# Patient Record
Sex: FEMALE
[De-identification: names, ages, dates, MRNs, and addresses within clinical notes are randomized per-mention and may not be internally consistent; named-entity substitution may affect disease eponyms.]

---

## 2018-07-25 ENCOUNTER — HOSPITAL ENCOUNTER (EMERGENCY)
Dept: HOSPITAL 14 - H.ER | Age: 73
LOS: 1 days | Discharge: HOME | End: 2018-07-26
Payer: MEDICARE

## 2018-07-25 DIAGNOSIS — Z79.82: ICD-10-CM

## 2018-07-25 DIAGNOSIS — Z99.2: ICD-10-CM

## 2018-07-25 DIAGNOSIS — I12.0: ICD-10-CM

## 2018-07-25 DIAGNOSIS — K59.00: Primary | ICD-10-CM

## 2018-07-25 DIAGNOSIS — N18.6: ICD-10-CM

## 2018-07-25 DIAGNOSIS — Z79.4: ICD-10-CM

## 2018-07-25 PROCEDURE — 80053 COMPREHEN METABOLIC PANEL: CPT

## 2018-07-25 PROCEDURE — 83690 ASSAY OF LIPASE: CPT

## 2018-07-25 PROCEDURE — 74022 RADEX COMPL AQT ABD SERIES: CPT

## 2018-07-25 PROCEDURE — 85025 COMPLETE CBC W/AUTO DIFF WBC: CPT

## 2018-07-25 PROCEDURE — 83605 ASSAY OF LACTIC ACID: CPT

## 2018-07-25 PROCEDURE — 99284 EMERGENCY DEPT VISIT MOD MDM: CPT

## 2018-07-26 VITALS
TEMPERATURE: 98.4 F | OXYGEN SATURATION: 100 % | RESPIRATION RATE: 18 BRPM | DIASTOLIC BLOOD PRESSURE: 69 MMHG | SYSTOLIC BLOOD PRESSURE: 130 MMHG | HEART RATE: 88 BPM

## 2018-07-26 LAB
ALBUMIN SERPL-MCNC: 4.1 G/DL (ref 3.5–5)
ALBUMIN/GLOB SERPL: 1.1 {RATIO} (ref 1–2.1)
ALT SERPL-CCNC: 29 U/L (ref 9–52)
AST SERPL-CCNC: 40 U/L (ref 14–36)
BASOPHILS # BLD AUTO: 0.1 K/UL (ref 0–0.2)
BASOPHILS NFR BLD: 1 % (ref 0–2)
BUN SERPL-MCNC: 41 MG/DL (ref 7–17)
CALCIUM SERPL-MCNC: 9.1 MG/DL (ref 8.4–10.2)
EOSINOPHIL # BLD AUTO: 0.2 K/UL (ref 0–0.7)
EOSINOPHIL NFR BLD: 2.1 % (ref 0–4)
ERYTHROCYTE [DISTWIDTH] IN BLOOD BY AUTOMATED COUNT: 17.6 % (ref 11.5–14.5)
GFR NON-AFRICAN AMERICAN: 6
HGB BLD-MCNC: 12.3 G/DL (ref 12–16)
LIPASE SERPL-CCNC: 101 U/L (ref 23–300)
LYMPHOCYTES # BLD AUTO: 1.7 K/UL (ref 1–4.3)
LYMPHOCYTES NFR BLD AUTO: 16.8 % (ref 20–40)
MCH RBC QN AUTO: 29 PG (ref 27–31)
MCHC RBC AUTO-ENTMCNC: 32.5 G/DL (ref 33–37)
MCV RBC AUTO: 89.1 FL (ref 81–99)
MONOCYTES # BLD: 1.2 K/UL (ref 0–0.8)
MONOCYTES NFR BLD: 12.2 % (ref 0–10)
NEUTROPHILS # BLD: 6.9 K/UL (ref 1.8–7)
NEUTROPHILS NFR BLD AUTO: 67.9 % (ref 50–75)
NRBC BLD AUTO-RTO: 0 % (ref 0–0)
PLATELET # BLD: 363 K/UL (ref 130–400)
PMV BLD AUTO: 9.9 FL (ref 7.2–11.7)
RBC # BLD AUTO: 4.25 MIL/UL (ref 3.8–5.2)
WBC # BLD AUTO: 10.2 K/UL (ref 4.8–10.8)

## 2018-07-26 NOTE — ED PDOC
HPI: Abdomen


Time Seen by Provider: 07/25/18 23:26


Chief Complaint (Nursing): Abdominal Pain


Chief Complaint (Provider): Abdominal Pain


History Per: Patient


History/Exam Limitations: no limitations


Onset/Duration Of Symptoms: Days (x3 weeks)


Current Symptoms Are (Timing): Still Present


Location Of Pain/Discomfort: Diffuse


Quality Of Discomfort: "Pain"


Associated Symptoms: Constipation


Additional Complaint(s): 





72 year old female with a history of dm, htn, seizure and end-stage renal 

disease reports to the ED complaining of diffuse abdominal pain and 

constipation onset 3 weeks. Patient reports she has not had a bowel movement in 

3 weeks, but has not taken anything for the constipation. She states she has 

associated rectal pain and decreased appetite but denies nausea, vomiting, 

urinary symptoms, black or bloody stools. Patient is on dialysis. 





PMD Dr. Johnson 





Past Medical History


Reviewed: Historical Data, Nursing Documentation, Vital Signs


Vital Signs: 


 Last Vital Signs











Temp  98.4 F   07/26/18 02:04


 


Pulse  88   07/26/18 02:04


 


Resp  18   07/26/18 02:04


 


BP  130/69   07/26/18 02:04


 


Pulse Ox  100   07/26/18 02:04














- Medical History


PMH: Diabetes, Gastritis, HTN, End Stage Renal Disease (Hemodialysis M, W, F), 

Chronic Kidney Disease, Seizures


   Denies: Alzheimer's Disease, Anemia, Anxiety, Arthritis, Asthma, Atrial 

Fibrillation, Bipolar Disorder, Bronchitis, CAD, Cardia Arrhythmia, CHF, COPD, 

Crohn's Disease, Dementia, Depression, Diverticulitis, Emphysema, Fractures, 

Gall Bladder Disease, HIV, Hypercholesterolemia, Hyperthyroidism, Hypothyroidism

, Kidney Stones, Migraine, Mitral Valve Prolapse, Multiple Sclerosis, 

Osteoporosis, Pancreatitis, Paranoia, Parkinson's Disease, Peripheral Edema, 

Pneumonia, Post Traumatic Stress Disorder, Pulmonary Embolism, Rheumatoid 

Arthritis, Schizophrenia, Sickle Cell Disease, Sexually Transmitted Disease, 

Sleep Apnea, TIA





- Surgical History


Surgical History: 


   Denies: Pacemaker


Other surgeries: Left AV fistula





- Family History


Family History: States: No Known Family Hx





- Social History


Current smoker - smoking cessation education provided: No


Ex-Smoker (has not smoked in the last 12 months): No


Alcohol: None


Drugs: Denies





- Immunization History


Hx Tetanus Toxoid Vaccination: No


Hx Influenza Vaccination: No


Hx Pneumococcal Vaccination: No





- Home Medications


Home Medications: 


 Ambulatory Orders











 Medication  Instructions  Recorded


 


Insulin Aspart Mix [novolog Mix 10 unit SC BID 01/30/15





70/30]  


 


Phenytoin Sodium Extended 100 mg PO TID 01/30/15





[Dilantin]  


 


Amoxicillin [Amoxil 500 mg Cap] 500 mg PO BID 05/17/18


 


Butalbital/Aspirin/Caffeine 1 tab PO Q6 PRN 05/17/18





[Butalb-Aspirin-Caffe -40]  


 


Levofloxacin [Levaquin] 500 mg PO DAILY 05/17/18


 


Omeprazole 20 mg PO BID 05/17/18


 


Polyethylene Glycol 3350 [Miralax] 17 g PO QAM PRN #7 pkg 07/26/18














- Allergies


Allergies/Adverse Reactions: 


 Allergies











Allergy/AdvReac Type Severity Reaction Status Date / Time


 


No Known Allergies Allergy   Verified 07/25/18 22:54














Review of Systems


ROS Statement: Except As Marked, All Systems Reviewed And Found Negative


Gastrointestinal: Positive for: Abdominal Pain, Constipation





Physical Exam





- Reviewed


Nursing Documentation Reviewed: Yes


Vital Signs Reviewed: Yes





- Physical Exam


Appears: Positive for: Non-toxic, In Acute Distress (painful)


Head Exam: Positive for: ATRAUMATIC, NORMOCEPHALIC


Skin: Positive for: Warm, Dry


ENT: Negative for: Pharyngeal Erythema, Tonsillar Exudate


Neck: Positive for: Painless ROM, Supple


Cardiovascular/Chest: Positive for: Regular Rate, Rhythm


Respiratory: Positive for: Normal Breath Sounds.  Negative for: Respiratory 

Distress


Gastrointestinal/Abdominal: Positive for: Tenderness (mild diffuse tenderness 

to palpation ), Distended (softly).  Negative for: Mass, Guarding, Rebound


Rectal: Positive for: Normal Exam, Other (no fecal impaction, no gross blood, 

chaperone was [facility] nurse)


Extremity: Positive for: Other (LEFT upper arm AV fistula with thrill).  

Negative for: Deformity


Lymphatic: Negative for: Adenopathy


Neurologic/Psych: Positive for: Alert.  Negative for: Motor/Sensory Deficits





- Laboratory Results


Result Diagrams: 


 07/25/18 23:55





 07/25/18 23:55





Medical Decision Making


Medical Decision Making: 





Time: 23:38


Initial Impression: abdominal 


Differential diagnoses include but are not limited to: constipation, obstruction

, colitis, or diverticulitis


Initial Plan:


--CMP   


--Lact acid


-- Lipase


--Urine dip


--CBC with differentials


--Obstructive Series XR


--Occult Blood, Stool








--------------------------------------------------------------------------------

-----------------


Scribe Attestation:


Documented by Carrie Díaz, acting as a scribe for Vanessa Ibarra MD





Provider Scribe Attestation:


All medical record entries made by the Scribe were at my direction and 

personally dictated by me. I have reviewed the chart and agree that the record 

accurately reflects my personal performance of the history, physical exam, 

medical decision making, and the department course for this patient. I have 

also personally directed, reviewed, and agree with the discharge instructions 

and disposition.





Disposition





- Clinical Impression


Clinical Impression: 


 Constipation








- Disposition


Disposition: Transfer of Care


Disposition Time: 00:00


Condition: STABLE


Prescriptions: 


Polyethylene Glycol 3350 [Miralax] 17 g PO QAM PRN #7 pkg


 PRN Reason: Constipation


Instructions:  Constipation in Adults


Forms:  [employer] (English)


Print Language: Cape Verdean


Patient Signed Over To: Grayson George


Handoff Comments: Pending ER workup, reassessment and final disposition

## 2018-07-26 NOTE — RAD
Date of service: 



07/26/2018



PROCEDURE:  Radiographs of the chest and abdomen (obstructive series)



HISTORY:

abd pain r/o sbo  



COMPARISON:

Chest radiograph 05/17/2018.



TECHNIQUE:

AP radiograph of the chest, with upright and supine radiographs of 

the abdomen.



FINDINGS:



CHEST:

Lungs: No infiltrate bilaterally.  Limited linear fibrotic changes 

are reiterated at the inferior left lung zone.



Cardiovascular: Stable mild cardiomegaly. No definite pulmonary 

vascular congestion.



Pleura: No pleural fluid. No pneumothorax.



Other findings: Left axilla/proximal upper extremity wallstents.



ABDOMEN AND PELVIS:

Bowel: Nonobstructive bowel gas pattern is appreciated.  Prominent 

fecal loading is seen throughout the colon and may indicate 

constipation.



Free air: None.



Bones:  Unremarkable.



Other findings: No definite abnormal internal calcifications.



IMPRESSION:

1. Nonobstructive bowel gas pattern, however, prominent fecal loading 

throughout the colon may indicate constipation. No free 

intraperitoneal gas. 



2. Stable mild cardiomegaly. No pulmonary vascular congestion. 

Limited fibrosis left base laterally.

## 2018-07-26 NOTE — ED PDOC
- Laboratory Results


Result Diagrams: 


 07/25/18 23:55





 07/25/18 23:55





Medical Decision Making


Medical Decision Making: 








Time: 00:00


--Patient signed out to this provider by Dr. Ibarra, pending XR, labs and 

reevaluation,








Time: 00:45


--Chest series shows no active disease


--Abdomen series shows gross amounts of stool in colon and rectum, but no free 

air or fluid bubbles.








Time: 1:50


--Labs showed no clinically significant results except elevated creatinine, 

which is consistent with end stage renal disease. Patient feels better after 

complete enema and had a large bowel movement. She is ready for discharge home. 

Diagnosis constipation.











--------------------------------------------------------------------------------

-----------------


Scribe Attestation:


Documented by Carrie Díaz, acting as a scribe for Grayson George MD





Provider Scribe Attestation:


All medical record entries made by the Scribe were at my direction and 

personally dictated by me. I have reviewed the chart and agree that the record 

accurately reflects my personal performance of the history, physical exam, 

medical decision making, and the department course for this patient. I have 

also personally directed, reviewed, and agree with the discharge instructions 

and disposition.





Disposition





- Clinical Impression


Clinical Impression: 


 Constipation








- POA


Present On Arrival: None





- Disposition


Disposition: Routine/Home


Disposition Time: 01:50


Condition: STABLE


Prescriptions: 


Polyethylene Glycol 3350 [Miralax] 17 g PO QAM PRN #7 pkg


 PRN Reason: Constipation


Instructions:  Constipation in Adults


Forms:  [facility] (English)


Print Language: Ukrainian

## 2018-07-27 ENCOUNTER — HOSPITAL ENCOUNTER (EMERGENCY)
Dept: HOSPITAL 14 - H.ER | Age: 73
LOS: 1 days | Discharge: HOME | End: 2018-07-28
Payer: MEDICARE

## 2018-07-27 DIAGNOSIS — K59.00: Primary | ICD-10-CM

## 2018-07-27 DIAGNOSIS — I12.0: ICD-10-CM

## 2018-07-27 DIAGNOSIS — Z79.4: ICD-10-CM

## 2018-07-28 VITALS
TEMPERATURE: 98.8 F | RESPIRATION RATE: 18 BRPM | HEART RATE: 79 BPM | SYSTOLIC BLOOD PRESSURE: 149 MMHG | OXYGEN SATURATION: 94 % | DIASTOLIC BLOOD PRESSURE: 67 MMHG

## 2018-07-28 NOTE — RAD
Date of service: 



07/27/2018



PROCEDURE:  Radiographs of the chest and abdomen (obstructive series)



HISTORY:

constipation  



COMPARISON:

Obstructive series dated 07/26/2018.



TECHNIQUE:

AP radiograph of the chest, with upright and supine radiographs of 

the abdomen.



FINDINGS:



CHEST:

Lungs: Clear.



Cardiovascular: Atherosclerotic aortic calcifications.  

Cardiomediastinal silhouette stably enlarged.



Pleura: No pleural fluid. No pneumothorax.



Other findings: Partially imaged left axillary region vascular stent.



ABDOMEN AND PELVIS:

Bowel: Prominent amount of retained colonic stool. No evidence of 

mechanical obstruction.



Free air: None.



Bones:  Degenerative changes.



Other findings: None.



IMPRESSION:

Prominent amount of retained colonic stool.

## 2018-07-28 NOTE — ED PDOC
HPI: Abdomen


Time Seen by Provider: 07/27/18 22:53


Chief Complaint (Nursing): GI Problem


Chief Complaint (Provider): GI Problem


History Per: Patient


History/Exam Limitations: no limitations


Onset/Duration Of Symptoms: Days (x3)


Current Symptoms Are (Timing): Still Present


Additional Complaint(s): 


72 year old female arrives to ED with complaint of constipation associated with 

mild lower abdominal discomfort and rectal pain ongoing for 3 days. Reports she 

normally has a BM QD. Patient states she was given Rx for constipation, which 

she took today, but with no improvement. Of note, patient is due for 

hemodialysis tomorrow. Otherwise: (-) fever, (-) chills, (-) nausea, (-) 

vomiting or (-) urinary symptoms.





PMD: Dr. Cristina Johnson





Past Medical History


Reviewed: Historical Data, Nursing Documentation, Vital Signs


Vital Signs: 


 Last Vital Signs











Temp  98.9 F   07/27/18 22:45


 


Pulse  81   07/27/18 22:45


 


Resp  16   07/27/18 22:45


 


BP  187/83 H  07/27/18 22:45


 


Pulse Ox  99   07/28/18 01:37














- Medical History


PMH: Diabetes, Gastritis, HTN, End Stage Renal Disease (Hemodialysis M, W, F), 

Chronic Kidney Disease, Seizures


   Denies: Alzheimer's Disease, Anemia, Anxiety, Arthritis, Asthma, Atrial 

Fibrillation, Bipolar Disorder, Bronchitis, CAD, Cardia Arrhythmia, CHF, COPD, 

Crohn's Disease, Dementia, Depression, Diverticulitis, Emphysema, Fractures, 

Gall Bladder Disease, HIV, Hypercholesterolemia, Hyperthyroidism, Hypothyroidism

, Kidney Stones, Migraine, Mitral Valve Prolapse, Multiple Sclerosis, 

Osteoporosis, Pancreatitis, Paranoia, Parkinson's Disease, Peripheral Edema, 

Pneumonia, Post Traumatic Stress Disorder, Pulmonary Embolism, Rheumatoid 

Arthritis, Schizophrenia, Sickle Cell Disease, Sexually Transmitted Disease, 

Sleep Apnea, TIA





- Surgical History


Surgical History: 


   Denies: Pacemaker





- Family History


Family History: States: Unknown Family Hx





- Social History


Current smoker - smoking cessation education provided: No


Ex-Smoker (has not smoked in the last 12 months): No


Alcohol: None


Drugs: Denies





- Immunization History


Hx Tetanus Toxoid Vaccination: No


Hx Influenza Vaccination: No


Hx Pneumococcal Vaccination: No





- Home Medications


Home Medications: 


 Ambulatory Orders











 Medication  Instructions  Recorded


 


Insulin Aspart Mix [novolog Mix 10 unit SC BID 01/30/15





70/30]  


 


Phenytoin Sodium Extended 100 mg PO TID 01/30/15





[Dilantin]  


 


Amoxicillin [Amoxil 500 mg Cap] 500 mg PO BID 05/17/18


 


Butalbital/Aspirin/Caffeine 1 tab PO Q6 PRN 05/17/18





[Butalb-Aspirin-Caffe -40]  


 


Levofloxacin [Levaquin] 500 mg PO DAILY 05/17/18


 


Omeprazole 20 mg PO BID 05/17/18


 


Polyethylene Glycol 3350 [Miralax] 17 g PO QAM PRN #7 pkg 07/26/18


 


Polyethylene Glycol 3350 [Miralax] 17 pow PO DAILY #20 each 07/28/18














- Allergies


Allergies/Adverse Reactions: 


 Allergies











Allergy/AdvReac Type Severity Reaction Status Date / Time


 


No Known Allergies Allergy   Verified 07/25/18 22:54














Review of Systems


ROS Statement: Except As Marked, All Systems Reviewed And Found Negative


Constitutional: Negative for: Fever, Chills


Gastrointestinal: Positive for: Abdominal Pain (lower), Constipation, Rectal 

Pain.  Negative for: Nausea, Vomiting


Genitourinary Female: Negative for: Dysuria, Incontinence, Hematuria





Physical Exam





- Reviewed


Nursing Documentation Reviewed: Yes


Vital Signs Reviewed: Yes





- Physical Exam


Comments: 


GENERAL APPEARANCE: Patient is awake, alert, oriented x 3, in no distress. 


SKIN:  Warm, dry; (-) cyanosis.


EYES:  (-) conjunctival pallor, (-) scleral icterus.


ENMT:  Mucous membranes moist.


NECK:  (-) tenderness, (-) stiffness, (-) lymphadenopathy.


CHEST AND RESPIRATORY:  (-) rales, (-) rhonchi, (-) wheezes; breath sounds 

equal bilaterally.


HEART AND CARDIOVASCULAR:  (-) irregularity; (-) murmur, (-) gallop.


ABDOMEN AND GI:  (+) Mild distention, (+) soft.  Bowel sounds active; (-) 

tenderness. (-) guarding, (-) rebound, (-) palpable masses, (-) CVA tenderness.


RECTAL: Performed with ER RN present as chaperone. (+) Hard stools, (-) fecal 

impaction, (-) guaiac test.


EXTREMITIES:  (-) deformity, (-) edema, (+) distal pulses.


NEURO AND PSYCH:  Mental status as above; (-) focal findings.





- ECG


O2 Sat by Pulse Oximetry: 99 (RA)


Pulse Ox Interpretation: Normal





Medical Decision Making


Medical Decision Making: 


Initial Impression: Abdominal pain; constipation





Initial Plan:


* Xray obstructive series


* Enulose 20gm PO


* Fleet enema 135ml LA


----------





Time: 2340


--Xray obstructive series: interpreted by PA. Moderate constipation noted. Non-

specific bowel patterns.








On re-evaluation, patient reports improvement of symptoms, denies any abdominal 

pain, patient had a few BMs in the ER. On exam, patient remains AAOx3, in no 

acute distress. 


Diagnostic results d/w the patient in great detail. Diagnosis of constipation d/

w the patient. Advised to increase intake of fiber rich foods and do some light 

daily exercise such as 30 mins of brisk walking


Based on history, exam and diagnostic results, plan will be for outpatient 

follow up. 


Patient instructed to follow-up with pmd in 1-2 days without fail. Advised to 

take medication as prescribed. Return to the emergency room at any time for any 

new or worsening symptoms. Patient states she fully agrees with and understands 

discharge instructions. States that she agrees with the plan and disposition. 

Verbalized and repeated discharge instructions and plan. I have given the 

patient opportunity to ask any additional questions.





--------------------------------------------------------------------------------

-----------------


Scribe Attestation:


Documented by Joyce Conrad, acting as a scribe for Polina Lloyd PA-C.





Provider Scribe Attestation:


All medical record entries made by the Scribe were at my direction and 

personally dictated by me. I have reviewed the chart and agree that the record 

accurately reflects my personal performance of the history, physical exam, 

medical decision making, and the department course for this patient. I have 

also personally directed, reviewed, and agree with the discharge instructions 

and disposition.





Disposition





- Clinical Impression


Clinical Impression: 


 Constipation








- Patient ED Disposition


Is Patient to be Admitted: No


Counseled Patient/Family Regarding: Studies Performed, Diagnosis, Need For 

Followup, Rx Given





- Disposition


Disposition: Routine/Home


Disposition Time: 02:15


Condition: STABLE


Additional Instructions: 


Farshad por dejarnos atenderlo hoy. Usted fue tratado por estreimiento. La 

atencin mdica de emergencia que recibi hoy se dirigi a los sntomas agudos 

de presentacin. Aumente la ingesta de alimentos ricos en fibra y mario algunos 

ejercicios diarios ligeros, florian caminar brenden 30 minutos. Si le 

prescribieron algn medicamento, por favor llnelo y d florian indicado. Brittaney s

ntomas pueden tardar varios dan en resolverse. Regrese al Departamento de 

Emergencia en cualquier momento si los sntomas empeoran, no mejoran o si surge 

algn otro problema.





Por favor, pngase en contacto con ndiaye mdico en 2 dan para jordan nueva evaluaci

n y seguimiento. Lleve todos los documentos que recibi al momento del maryuri 

junto con los medicamentos a ndiaye visita de seguimiento. Nuestro tratamiento no 

puede reemplazar la atencin mdica en curso por parte de un proveedor de atenci

n primaria (PCP) fuera del departamento de emergencias.





Farshad por permitir que el equipo de [facility] sea parte de ndiaye cuidado 

hoy.


Prescriptions: 


Polyethylene Glycol 3350 [Miralax] 17 pow PO DAILY #20 each


Instructions:  Constipation in Adults


Forms:  [employer] (English)


Print Language: Moldovan

## 2018-08-05 ENCOUNTER — HOSPITAL ENCOUNTER (EMERGENCY)
Dept: HOSPITAL 31 - C.ER | Age: 73
Discharge: HOME | End: 2018-08-05
Payer: MEDICARE

## 2018-08-05 VITALS
SYSTOLIC BLOOD PRESSURE: 180 MMHG | RESPIRATION RATE: 18 BRPM | HEART RATE: 72 BPM | TEMPERATURE: 98.2 F | DIASTOLIC BLOOD PRESSURE: 75 MMHG | OXYGEN SATURATION: 97 %

## 2018-08-05 DIAGNOSIS — R56.9: Primary | ICD-10-CM

## 2018-08-05 DIAGNOSIS — Z76.0: ICD-10-CM

## 2018-08-05 NOTE — C.PDOC
History Of Present Illness


71 y/o female presents to the ER requesting a refill for Dilatin. She explained 

she is unable to visit Dr. Odom until Tuesday. The patient offers no other 

medical complaints at this time. 


Time Seen by Provider: 08/05/18 21:17


Chief Complaint (Nursing): Med Refill


History Per: Patient


History/Exam Limitations: no limitations





Past Medical History


Vital Signs: 


 Last Vital Signs











Temp  98.2 F   08/05/18 21:11


 


Pulse  72   08/05/18 21:11


 


Resp  18   08/05/18 21:11


 


BP  180/75 H  08/05/18 21:11


 


Pulse Ox  97   08/05/18 21:28














- Medical History


PMH: Diabetes, Gastritis, HTN, End Stage Renal Disease (Hemodialysis M, W, F), 

Chronic Kidney Disease, Seizures


   Denies: Alzheimer's Disease, Anemia, Anxiety, Arthritis, Asthma, Atrial 

Fibrillation, Bipolar Disorder, Bronchitis, CAD, Cardia Arrhythmia, CHF, COPD, 

Crohn's Disease, Dementia, Depression, Diverticulitis, Emphysema, Fractures, 

Gall Bladder Disease, HIV, Hypercholesterolemia, Hyperthyroidism, Hypothyroidism

, Kidney Stones, Migraine, Mitral Valve Prolapse, Multiple Sclerosis, 

Osteoporosis, Pancreatitis, Paranoia, Parkinson's Disease, Peripheral Edema, 

Pneumonia, Post Traumatic Stress Disorder, Pulmonary Embolism, Rheumatoid 

Arthritis, Schizophrenia, Sickle Cell Disease, Sexually Transmitted Disease, 

Sleep Apnea, TIA


Surgical History: 


   Denies: Pacemaker





- [facility] Procedures








CENTRAL VENOUS CATHETER PLACEMENT WITH GUIDANCE (02/09/14)


CONTRAST AORTOGRAM (12/06/13)


CORONAR ARTERIOGR-2 CATH (12/11/13)


HEMODIALYSIS (01/30/15)


INJECT ANTICOAGULANT (12/13/13)


LEFT HEART CARDIAC CATH (12/11/13)


LT HEART ANGIOCARDIOGRAM (12/11/13)


PERFORMANCE OF URINARY FILTRATION, MULTIPLE (01/19/16)








Family History: States: Unknown Family Hx





- Social History


Hx Tobacco Use: No


Hx Alcohol Use: No


Hx Substance Use: No





- Immunization History


Hx Tetanus Toxoid Vaccination: No


Hx Influenza Vaccination: No


Hx Pneumococcal Vaccination: No





Review Of Systems


Except As Marked, All Systems Reviewed And Found Negative.


Constitutional: Negative for: Fever





Physical Exam





- Physical Exam


Appears: Well, Non-toxic, No Acute Distress


Skin: Normal Color, Warm


Head: Atraumatic, Normacephalic


Eye(s): bilateral: PERRL, EOMI


Ear(s): Bilateral: Normal


Oral Mucosa: Moist


Neck: Normal ROM


Chest: Symmetrical


Cardiovascular: Rhythm Regular, No Murmur


Respiratory: Normal Breath Sounds, No Rales, No Rhonchi, No Wheezing


Extremity: Normal ROM


Extremity: Bilateral: Atraumatic


Pulses: Left Radial: Normal, Right Radial: Normal


Neurological/Psych: Oriented x3


Gait: With Assistance (cane)





ED Course And Treatment


O2 Sat by Pulse Oximetry: 97 (RA )


Pulse Ox Interpretation: Normal





Medical Decision Making


Medical Decision Making: 


Prescription was refilled for patient. She is stable for discharge.








Disposition





- Disposition


Referrals: 


Hermelindo Odom MD [Primary Care Provider] - 


Disposition: HOME/ ROUTINE


Disposition Time: 21:23


Condition: GOOD


Additional Instructions: 


Follow up with the medical doctor within 1-2 days/clinic within 1-2 days. 

Return if worsened. 


Prescriptions: 


Phenytoin Sodium Extended 100 mg PO TID #12 cer


Instructions:  Seizures, Adult (DC)


Forms:  [employer] (English)


Print Language: French





- Clinical Impression


Clinical Impression: 


 Seizure, Medication refill








- PA / NP / Resident Statement


MD/DO has reviewed & agrees with the documentation as recorded.





- Scribe Statement


The provider has reviewed the documentation as recorded by the Scribe (Corry Gallegos)


All medical record entries made by the Scribe were at my direction and 

personally dictated by me. I have reviewed the chart and agree that the record 

accurately reflects my personal performance of the history, physical exam, 

medical decision making, and the department course for this patient. I have 

also personally directed, reviewed, and agree with the discharge instructions 

and disposition.

## 2018-09-18 ENCOUNTER — HOSPITAL ENCOUNTER (INPATIENT)
Dept: HOSPITAL 14 - H.ER | Age: 73
LOS: 6 days | Discharge: HOME | DRG: 871 | End: 2018-09-24
Attending: INTERNAL MEDICINE | Admitting: INTERNAL MEDICINE
Payer: MEDICARE

## 2018-09-18 VITALS — BODY MASS INDEX: 25 KG/M2

## 2018-09-18 DIAGNOSIS — K29.70: ICD-10-CM

## 2018-09-18 DIAGNOSIS — I12.0: ICD-10-CM

## 2018-09-18 DIAGNOSIS — N18.6: ICD-10-CM

## 2018-09-18 DIAGNOSIS — E11.22: ICD-10-CM

## 2018-09-18 DIAGNOSIS — A41.9: Primary | ICD-10-CM

## 2018-09-18 DIAGNOSIS — R06.03: ICD-10-CM

## 2018-09-18 DIAGNOSIS — E83.39: ICD-10-CM

## 2018-09-18 DIAGNOSIS — J18.9: ICD-10-CM

## 2018-09-18 DIAGNOSIS — E87.5: ICD-10-CM

## 2018-09-18 DIAGNOSIS — M54.5: ICD-10-CM

## 2018-09-18 DIAGNOSIS — J81.0: ICD-10-CM

## 2018-09-18 DIAGNOSIS — G40.909: ICD-10-CM

## 2018-09-18 DIAGNOSIS — Z85.43: ICD-10-CM

## 2018-09-18 DIAGNOSIS — J98.11: ICD-10-CM

## 2018-09-18 DIAGNOSIS — N25.81: ICD-10-CM

## 2018-09-18 DIAGNOSIS — J44.0: ICD-10-CM

## 2018-09-18 DIAGNOSIS — E87.79: ICD-10-CM

## 2018-09-18 DIAGNOSIS — Z99.2: ICD-10-CM

## 2018-09-18 DIAGNOSIS — N17.9: ICD-10-CM

## 2018-09-18 DIAGNOSIS — Z91.15: ICD-10-CM

## 2018-09-18 LAB
ALBUMIN SERPL-MCNC: 4.2 G/DL (ref 3.5–5)
ALBUMIN/GLOB SERPL: 1 {RATIO} (ref 1–2.1)
ALT SERPL-CCNC: 27 U/L (ref 9–52)
ANISOCYTOSIS BLD QL SMEAR: SLIGHT
ANISOCYTOSIS BLD QL SMEAR: SLIGHT
APTT BLD: 29.6 SECONDS (ref 25.6–37.1)
ARTERIAL BLOOD GAS O2 SAT: 98.6 % (ref 95–98)
ARTERIAL BLOOD GAS PCO2: 38 MM/HG (ref 35–45)
ARTERIAL BLOOD GAS TCO2: 28.2 MMOL/L (ref 22–28)
ARTERIAL PATENCY WRIST A: YES
AST SERPL-CCNC: 40 U/L (ref 14–36)
BASOPHILS # BLD AUTO: 0 K/UL (ref 0–0.2)
BASOPHILS # BLD AUTO: 0 K/UL (ref 0–0.2)
BASOPHILS NFR BLD: 0.1 % (ref 0–2)
BASOPHILS NFR BLD: 0.2 % (ref 0–2)
BUN SERPL-MCNC: 25 MG/DL (ref 7–17)
BUN SERPL-MCNC: 66 MG/DL (ref 7–17)
CALCIUM SERPL-MCNC: 9 MG/DL (ref 8.4–10.2)
CALCIUM SERPL-MCNC: 9.3 MG/DL (ref 8.4–10.2)
EOSINOPHIL # BLD AUTO: 0 K/UL (ref 0–0.7)
EOSINOPHIL # BLD AUTO: 0 K/UL (ref 0–0.7)
EOSINOPHIL NFR BLD: 0 % (ref 0–4)
EOSINOPHIL NFR BLD: 0.4 % (ref 0–4)
ERYTHROCYTE [DISTWIDTH] IN BLOOD BY AUTOMATED COUNT: 17.5 % (ref 11.5–14.5)
ERYTHROCYTE [DISTWIDTH] IN BLOOD BY AUTOMATED COUNT: 17.7 % (ref 11.5–14.5)
GFR NON-AFRICAN AMERICAN: 4
GFR NON-AFRICAN AMERICAN: 9
HCO3 BLDA-SCNC: 27.3 MMOL/L (ref 21–28)
HEPATITIS B CORE AB: NEGATIVE
HGB BLD-MCNC: 11.3 G/DL (ref 12–16)
HGB BLD-MCNC: 12.1 G/DL (ref 12–16)
HYPOCHROMIC: SLIGHT
INHALED O2 CONCENTRATION: 36 %
INR PPP: 1
LYMPHOCYTE: 11 % (ref 20–50)
LYMPHOCYTE: 7 % (ref 20–50)
LYMPHOCYTES # BLD AUTO: 0.7 K/UL (ref 1–4.3)
LYMPHOCYTES # BLD AUTO: 0.9 K/UL (ref 1–4.3)
LYMPHOCYTES NFR BLD AUTO: 4.2 % (ref 20–40)
LYMPHOCYTES NFR BLD AUTO: 5.8 % (ref 20–40)
MCH RBC QN AUTO: 26.9 PG (ref 27–31)
MCH RBC QN AUTO: 27.3 PG (ref 27–31)
MCHC RBC AUTO-ENTMCNC: 31.7 G/DL (ref 33–37)
MCHC RBC AUTO-ENTMCNC: 32.3 G/DL (ref 33–37)
MCV RBC AUTO: 84.6 FL (ref 81–99)
MCV RBC AUTO: 84.9 FL (ref 81–99)
MICROCYTES BLD QL SMEAR: SLIGHT
MONOCYTE: 2 % (ref 0–10)
MONOCYTE: 5 % (ref 0–10)
MONOCYTES # BLD: 0.3 K/UL (ref 0–0.8)
MONOCYTES # BLD: 1.2 K/UL (ref 0–0.8)
MONOCYTES NFR BLD: 2.3 % (ref 0–10)
MONOCYTES NFR BLD: 5.4 % (ref 0–10)
NEUTROPHILS # BLD: 10.4 K/UL (ref 1.8–7)
NEUTROPHILS # BLD: 19.7 K/UL (ref 1.8–7)
NEUTROPHILS NFR BLD AUTO: 78 % (ref 42–75)
NEUTROPHILS NFR BLD AUTO: 88 % (ref 42–75)
NEUTROPHILS NFR BLD AUTO: 90.2 % (ref 50–75)
NEUTROPHILS NFR BLD AUTO: 91.4 % (ref 50–75)
NEUTS BAND NFR BLD: 3 % (ref 0–2)
NEUTS BAND NFR BLD: 6 % (ref 0–2)
NRBC BLD AUTO-RTO: 0 % (ref 0–0)
NRBC BLD AUTO-RTO: 0 % (ref 0–0)
OVALOCYTES BLD QL SMEAR: SLIGHT
PH BLDA: 7.46 [PH] (ref 7.35–7.45)
PLATELET # BLD EST: NORMAL 10*3/UL
PLATELET # BLD EST: NORMAL 10*3/UL
PLATELET # BLD: 196 K/UL (ref 130–400)
PLATELET # BLD: 217 K/UL (ref 130–400)
PMV BLD AUTO: 9.1 FL (ref 7.2–11.7)
PMV BLD AUTO: 9.3 FL (ref 7.2–11.7)
PO2 BLDA: 108 MM/HG (ref 80–100)
POIKILOCYTOSIS BLD QL SMEAR: SLIGHT
PROTHROMBIN TIME: 10.6 SECONDS (ref 9.8–13.1)
RBC # BLD AUTO: 4.2 MIL/UL (ref 3.8–5.2)
RBC # BLD AUTO: 4.43 MIL/UL (ref 3.8–5.2)
TOTAL CELLS COUNTED BLD: 100
TOTAL CELLS COUNTED BLD: 100
TROPONIN I SERPL-MCNC: 0.06 NG/ML (ref 0–0.12)
WBC # BLD AUTO: 11.3 K/UL (ref 4.8–10.8)
WBC # BLD AUTO: 21.8 K/UL (ref 4.8–10.8)

## 2018-09-18 PROCEDURE — 5A1D70Z PERFORMANCE OF URINARY FILTRATION, INTERMITTENT, LESS THAN 6 HOURS PER DAY: ICD-10-PCS | Performed by: SPECIALIST

## 2018-09-18 RX ADMIN — PANTOPRAZOLE SODIUM SCH MG: 40 TABLET, DELAYED RELEASE ORAL at 22:48

## 2018-09-18 NOTE — CP.PCM.CON
History of Present Illness





- History of Present Illness


History of Present Illness: 





Infectious Disease Consultation Note-





Asked to see this patient at the request of  for rule out sepsis.








HPI-


Patient is a 72 year old female with PMH of DM II, ESRD On HD, HTN, who was 

brought to the emergency department by EMS for sob as she was being picked up 

for transport to dialysis and hence pt. was directly brought to ER for further 

evaluation .


On arrival here pt. was noticed to have high potassium and 905 oxygen 

saturation and fever of 101 and somewhat confused


Pt. admitted to ICU for further evaluation and treatment and i'm asked to rule 

out sepsis.


pt. is awake and alert but slightly confused with certain questions.


denies any HA, + fever, + Sob, no cough, denies any chest pain, pos abd. pain 

and nausea, denies any diarrhea.








Review of Systems





- Review of Systems


Review of Systems: 





ROS-


+ fever, denies any HA, + sob, no cough, denies any chest p[ain, + abd pain 

throughout, + nausea, denies any diarrhea





Past Patient History





- Infectious Disease


Hx of Infectious Diseases: None





- Past Medical History & Family History


Past Medical History?: Yes





- Past Social History


Smoking Status: Never Smoked


Home Situation {Lives}: With Family





- CARDIAC


Hx Cardiac Disorders: Yes


Hx Angina: No


Hx Atrial Fibrillation: No


Hx Cardia Arrhythmia: No


Hx Circulatory Problems: No


Hx Congestive Heart Failure: Yes


Hx Heart Attack: No


Hx Heart Murmur: No


Hx Heart Transplant: No


Hx Hypercholesterolemia: No


Hx Hypertension: Yes


Hx Hypotension: No


Hx Internal Defibrillator: No


Hx Mitral Valve Prolapse: No


Hx Pacemaker: No


Hx Peripheral Edema: No


Hx Peripheral Vascular Disease: No





- PULMONARY


Hx Respiratory Disorders: Yes


Hx Asthma: No


Hx Bronchitis: No


Hx Chronic Obstructive Pulmonary Disease (COPD): No


Hx Emphysema: No


Hx Lung Cancer: No


Hx Pneumonia: No


Hx Pulmonary Edema: No


Hx Pulmonary Embolism: No


Hx Respiratory Aspiration: No


Hx Respiratory Tract Infection: No


Hx Sleep Apnea: No


Hx Tuberculosis: No


Other/Comment: SOB





- NEUROLOGICAL


Hx Neurological Disorder: Yes


Hx Alzheimer's Disease: No


HX Cerebrovascular Accident: No


Hx Dementia: No


Hx Dizziness: No


Hx Meningitis: No


Hx Migraine: No


Hx Multiple Sclerosis: No


Hx Paralysis: No


Hx Parkinson's Disease: No


Hx Seizures: Yes


Hx Syncope: No


Hx Transient Ischemic Attacks (TIA): No


Hx Vertigo: No


Other/Comment: CONFUSED





- HEENT


Hx HEENT Problems: Yes


Hx Blind: No


Hx Cataracts: No


Hx Deafness: No


Hx Difficulty Chewing: No


Hx Epistaxis: No


Hx Glaucoma: No


Hx Macular Degeneration: No


Hx Sinusitis: No


Other/Comment: GLASSES





- RENAL


Hx Chronic Kidney Disease: Yes


Hx Dialysis: Yes (TUES, THURS, SAT)


Type of Dialysis Access: LEFT AV SHUNT


Hx Kidney Stones: No


Hx Neurogenic Bladder: No


Hx Pyelonephritis: No


Hx Renal (Kidney) Cancer: No


Hx Renal Failure: Yes





- ENDOCRINE/METABOLIC


Hx Endocrine Disorders: Yes


Hx Adrenal Cancer: No


Hx Diabetes Insipidus: No


Hx Diabetes Mellitus Type 1: No


Hx Diabetes Mellitus Type 2: Yes


Hx Hyperthyroidism: No


Hx Hypothyroidism: No


Hx Systemic Lupus Erythematosus: No





- HEMATOLOGICAL/ONCOLOGICAL


Hx Blood Disorders: No


Hx AIDS: No


Hx Anemia: No


Hx Blood Transfusions: No (UNKNOWN, CONFUSED)


Hx Blood Transfusion Reaction: No


Hx Bruising: No


Hx Cancer: No


Hx Chemotherapy: No


Hx Cirrhosis: No


Hx Gum Bleeding: No


Hx Hemophilia: No


Hx Hepatitis A: No


Hx Hepatitis B: No


Hx Hepatitis C: No


Hx Human Immunodeficiency Virus (HIV): No


Hx Leukemia: No


Hx Metastesis: No


Hx Shingles: No


Hx Sickle Cell Disease: No


Hx Unexplained Bleeding: No


Hx von Willebrand's Disease: No





- INTEGUMENTARY


Hx Dermatological Problems: No


Hx Basil Cell: No


Hx Burns: No


Hx Cellulitis: No


Hx Eczema: No


Hx Melanoma: No


Hx Psoriasis: No


Hx Squamous Cell: No





- MUSCULOSKELETAL/RHEUMATOLOGICAL


Hx Musculoskeletal Disorders: Yes


Hx Arthritis: No


Hx Back Pain: No


Hx Degenerative Joint Disease: No


Hx Falls: No (unknwon)


Hx Fractures: No


Hx Gout: No


Hx Herniated Disk: No


Hx Myasthenia Gravis: No


Hx Osteoarthritis: No


Hx Osteomyelitis: No


Hx Osteoporosis: No


Hx Rhabdomyolysis: No


Hx Rheumatoid Arthritis: No


Hx Spinal Stenosis: No


Hx Unsteady Gait: Yes





- GASTROINTESTINAL


Hx Gastrointestinal Disorders: Yes


Hx Bowel Surgery: No


Hx Clostridium Difficile: No


Hx Colitis: No


Hx Colostomy: No


Hx Constipation: No


Hx Crohn's Disease: No


Hx Diarrhea: No


Hx Diverticulitis: No


Hx Esophageal Varices: No


Hx Fatty Liver Disease: No


Hx Gall Bladder Disease: No


Hx Gastritis: Yes


Hx Gastroesophageal Reflux: No


Hx Hemorrhoids: No


Hx Ileostomy: No


Hx Irritable Bowel: No


Hx Liver Failure: No


Hx Nausea: No


Hx Pancreatitis: No


HX Swallowing Problems: No


Hx Ulcer: No





- GENITOURINARY/GYNECOLOGICAL


Hx Genitourinary Disorders: Yes


Hx Bladder Cancer: No


Hx Bladder Stone: No


Hx Cervical Cancer: No


Hx Hematuria: No


Hx Incontinence: No


Hx Ovarian Cancer: Yes


Hx Postmenopausal Bleeding: No


Hx Reproductive Disorders: No


Hx Sexually Transmitted Disorders: No


Hx Uterine Cancer: No


Hx Urinary Tract Infection: No





- PSYCHIATRIC


Hx Psychophysiologic Disorder: Yes


Hx Anxiety: No


Hx Bipolar Disorder: No


Hx Depression: No


Hx Emotional Abuse: No


Hx Hallucinations: No


Hx Panic Symptoms: No


Hx Paranoia: No


Hx Post Traumatic Stress Disorder: No


Hx Psychosis: No


Hx Physical Abuse: No


Hx Schizophrenia: No


Hx Sexual Abuse: No


Hx Substance Use: No


Other/Comment: CONFUSED





- SURGICAL HISTORY


Hx Surgeries: Yes


Hx Abdominal Aortic Aneurysm Repair: No


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





- ANESTHESIA


Hx Anesthesia: Yes


Hx Anesthesia Reactions: No


Hx Malignant Hyperthermia: No


Has any member of the family had a problem w/ anesthesia?: No (UNKNOWN)





Meds


Allergies/Adverse Reactions: 


 Allergies











Allergy/AdvReac Type Severity Reaction Status Date / Time


 


No Known Allergies Allergy   Verified 08/05/18 21:01














- Medications


Medications: 


 Current Medications





Heparin Sodium (Porcine) (Heparin)  5,000 units SC Q8 BOWEN


   PRN Reason: Protocol











Physical Exam





- Constitutional


Appears: No Acute Distress





- Head Exam


Head Exam: ATRAUMATIC





- Eye Exam


Eye Exam: EOMI, PERRL





- ENT Exam


ENT Exam: Normal Oropharynx





- Neck Exam


Neck exam: Positive for: Full Rom





- Respiratory Exam


Additional comments: 





bibasilar crackles


no wheezing


slight tachypnea





- Cardiovascular Exam


Cardiovascular Exam: RRR, +S1, +S2





- GI/Abdominal Exam


GI & Abdominal Exam: Normal Bowel Sounds, Soft


Additional comments: 





+ mild tenderness throughtout abdomen


no guarding, no rebound


no CVA tenderness





- Extremities Exam


Extremities exam: Positive for: normal inspection





- Neurological Exam


Additional comments: 





awake but slightly confused





Results





- Vital Signs


Recent Vital Signs: 


 Last Vital Signs











Temp  100.8 F H  09/18/18 12:35


 


Pulse  89   09/18/18 14:52


 


Resp  25 H  09/18/18 14:52


 


BP  148/68   09/18/18 14:30


 


Pulse Ox  96   09/18/18 14:52














- Labs


Result Diagrams: 


 09/19/18 04:30





 09/19/18 04:30


Labs: 


 Laboratory Results - last 24 hr











  09/18/18 09/18/18 09/18/18





  11:00 11:24 11:24


 


WBC   


 


RBC   


 


Hgb   


 


Hct   


 


MCV   


 


MCH   


 


MCHC   


 


RDW   


 


Plt Count   


 


MPV   


 


Neut % (Auto)   


 


Lymph % (Auto)   


 


Mono % (Auto)   


 


Eos % (Auto)   


 


Baso % (Auto)   


 


Neut # (Auto)   


 


Lymph # (Auto)   


 


Mono # (Auto)   


 


Eos # (Auto)   


 


Baso # (Auto)   


 


Neutrophils % (Manual)   


 


Band Neutrophils %   


 


Lymphocytes % (Manual)   


 


Monocytes % (Manual)   


 


Platelet Estimate   


 


Anisocytosis (manual)   


 


PT   10.6 


 


INR   1.0 


 


APTT   29.6 


 


pCO2    38


 


pO2    108 H


 


HCO3    27.3


 


ABG pH    7.46 H


 


ABG Total CO2    28.2 H


 


ABG O2 Saturation    98.6 H


 


ABG Base Excess    3.1 H


 


Bruce Test    Yes


 


ABG Potassium    7.2 H*


 


A-a O2 Difference    101.0


 


Sodium    131.0 L


 


Chloride    99.0


 


Glucose    109 H


 


Lactate    0.8


 


Vent Mode    Nc


 


FiO2    36.0


 


Blood Gas Comments    Nc 4lpm


 


Crit Value Called To    Daisy garcia


 


Crit Value Called By    Nadja


 


Crit Value Read Back    Y


 


Blood Gas Notified Time    1136


 


Potassium   


 


Carbon Dioxide   


 


Anion Gap   


 


BUN   


 


Creatinine   


 


Est GFR ( Amer)   


 


Est GFR (Non-Af Amer)   


 


POC Glucose (mg/dL)  112 H  


 


Random Glucose   


 


Calcium   


 


Phosphorus   


 


Magnesium   


 


Total Bilirubin   


 


AST   


 


ALT   


 


Alkaline Phosphatase   


 


Troponin I   


 


NT-Pro-B Natriuret Pep   


 


Total Protein   


 


Albumin   


 


Globulin   


 


Albumin/Globulin Ratio   


 


Arterial Blood Potassium    7.2 H*


 


Phenytoin   














  09/18/18 09/18/18 09/18/18





  11:38 11:38 11:38


 


WBC  11.3 H  


 


RBC  4.43  


 


Hgb  12.1  


 


Hct  37.5  


 


MCV  84.6  D  


 


MCH  27.3  


 


MCHC  32.3 L  


 


RDW  17.7 H  


 


Plt Count  217  D  


 


MPV  9.3  


 


Neut % (Auto)  91.4 H  


 


Lymph % (Auto)  5.8 L  


 


Mono % (Auto)  2.3  


 


Eos % (Auto)  0.4  


 


Baso % (Auto)  0.1  


 


Neut # (Auto)  10.4 H  


 


Lymph # (Auto)  0.7 L  


 


Mono # (Auto)  0.3  


 


Eos # (Auto)  0.0  


 


Baso # (Auto)  0.0  


 


Neutrophils % (Manual)  88 H  


 


Band Neutrophils %  3 H  


 


Lymphocytes % (Manual)  7 L  


 


Monocytes % (Manual)  2  


 


Platelet Estimate  Normal  


 


Anisocytosis (manual)  Slight  


 


PT   


 


INR   


 


APTT   


 


pCO2   


 


pO2   


 


HCO3   


 


ABG pH   


 


ABG Total CO2   


 


ABG O2 Saturation   


 


ABG Base Excess   


 


Bruce Test   


 


ABG Potassium   


 


A-a O2 Difference   


 


Sodium   136 


 


Chloride   96 L 


 


Glucose   


 


Lactate   


 


Vent Mode   


 


FiO2   


 


Blood Gas Comments   


 


Crit Value Called To   


 


Crit Value Called By   


 


Crit Value Read Back   


 


Blood Gas Notified Time   


 


Potassium   8.0 H* D 


 


Carbon Dioxide   25 


 


Anion Gap   23 H 


 


BUN   66 H 


 


Creatinine   9.1 H* D 


 


Est GFR ( Amer)   5 


 


Est GFR (Non-Af Amer)   4 


 


POC Glucose (mg/dL)   


 


Random Glucose   108 H 


 


Calcium   9.0 


 


Phosphorus   6.7 H 


 


Magnesium   2.4 H 


 


Total Bilirubin   1.0 


 


AST   40 H 


 


ALT   27 


 


Alkaline Phosphatase   155 H 


 


Troponin I   0.0600 


 


NT-Pro-B Natriuret Pep   01891 H 


 


Total Protein   8.4 H 


 


Albumin   4.2 


 


Globulin   4.2 H 


 


Albumin/Globulin Ratio   1.0 


 


Arterial Blood Potassium   


 


Phenytoin    4.1 L








Accession No. : [id number]


Patient Name / ID : SEVERINO JULIA  / [id number]


Exam Date : 09/18/2018 11:32:58 ( Approved )


Study Comment : 


Sex / Age : F  / 72Y





Creator : Amelia Lowe MD


Dictator : Amelia Lowe MD


 : 


Approver : Amelia Lowe MD


Approver2 : 





Report Date : 09/18/2018 13:49:53


My Comment : 


********************************************************************************

***





Date of service: 





09/18/2018





HISTORY:


 Sepsis Patient 





COMPARISON:


07/27/2018 





FINDINGS:





LUNGS:


There is airspace disease in the lower lobes





PLEURA:


No significant pleural effusion identified, no pneumothorax apparent.





CARDIOVASCULAR:


.  There is mild cardiomegaly.





OSSEOUS STRUCTURES:


No significant abnormalities.





VISUALIZED UPPER ABDOMEN:


Normal.





OTHER FINDINGS:


None.





IMPRESSION:


Mild cardiomegaly.  Suspect bibasilar atelectasis.





Assessment & Plan


(1) Chronic kidney disease with end stage renal failure on dialysis


Status: Acute   





(2) Acute-on-chronic renal failure


Status: Acute   





(3) Hyperkalemia


Status: Acute   Priority: High   





(4) Fever


Status: Acute   





- Assessment and Plan (Free Text)


Assessment: 





A/P-


72 year old female with PMH of DM II, ESRD on HD, admitted with sob and resp 

distress and fever.





etiology of the fever unclear at this time, however, pulmonary etiology such as 

pneumonia likely etiology in light of sob, low Oxygen sat and bibasilar 

crackles.


also advise to get staright cath urinalysis and urine cx to r/o urosepsis .





Plan-


check blood cx x 2.


check UA and urine cx ( straightcath as pt. is anuric).


check sputum cx.


check mycoplasma serology and Legionella AG.


continue with zosyn and VAnco empirically as started by primary team to cover 

for healthcare associated pneumonia.


add zithromax to cover for atypicals pending further results.


HD as per renal team








All above d/w patient at length.





Thank you for allowing me to take part in the care of this patient.





ICU time 50 minutes.

## 2018-09-18 NOTE — RAD
Date of service: 



09/18/2018



HISTORY:

 Sepsis Patient 



COMPARISON:

07/27/2018 



FINDINGS:



LUNGS:

There is airspace disease in the lower lobes



PLEURA:

No significant pleural effusion identified, no pneumothorax apparent.



CARDIOVASCULAR:

.  There is mild cardiomegaly.



OSSEOUS STRUCTURES:

No significant abnormalities.



VISUALIZED UPPER ABDOMEN:

Normal.



OTHER FINDINGS:

None.



IMPRESSION:

Mild cardiomegaly.  Suspect bibasilar atelectasis.

## 2018-09-18 NOTE — ED PDOC
HPI: SOB/CHF/COPD


Time Seen by Provider: 09/18/18 10:52


Chief Complaint (Nursing): Shortness Of Breath


Chief Complaint (Provider): Shortness of breath


History Per: Patient


History/Exam Limitations: clinical condition


Onset/Duration Of Symptoms: Hrs (today)


Current Symptoms Are (Timing): Still Present


Additional Complaint(s): 





Julia Severino is a 72 year old female, with a past medical history of HTN, 

diabetes and end stage renal disease, who was brought to the emergency 

department by EMS for evaluation of shortness of breath. Patient was being 

picked up by transport today for dialysis, but on arrival they noticed she was 

short of breath and was brought directly to the emergency department missing 

her dialysis today. She was noted to be 90% O2 sat on room air and has improved 

with nasal cannula. Limited history due to patient being uncooperative. 





PMD: Dr. Arndt





Past Medical History


Reviewed: Historical Data, Nursing Documentation, Vital Signs


Vital Signs: 


 Last Vital Signs











Temp  100.8 F H  09/18/18 12:24


 


Pulse  85   09/18/18 10:58


 


Resp  39 H  09/18/18 10:50


 


BP  164/68 H  09/18/18 12:02


 


Pulse Ox  99   09/18/18 12:21














- Medical History


PMH: Diabetes, Gastritis, HTN, End Stage Renal Disease (Hemodialysis M, W, F), 

Chronic Kidney Disease, Seizures


   Denies: Alzheimer's Disease, Anemia, Anxiety, Arthritis, Asthma, Atrial 

Fibrillation, Bipolar Disorder, Bronchitis, CAD, Cardia Arrhythmia, CHF, COPD, 

Crohn's Disease, Dementia, Depression, Diverticulitis, Emphysema, Fractures, 

Gall Bladder Disease, HIV, Hypercholesterolemia, Hyperthyroidism, Hypothyroidism

, Kidney Stones, Migraine, Mitral Valve Prolapse, Multiple Sclerosis, 

Osteoporosis, Pancreatitis, Paranoia, Parkinson's Disease, Peripheral Edema, 

Pneumonia, Post Traumatic Stress Disorder, Pulmonary Embolism, Rheumatoid 

Arthritis, Schizophrenia, Sickle Cell Disease, Sexually Transmitted Disease, 

Sleep Apnea, TIA





- Surgical History


Surgical History: 


   Denies: Pacemaker





- Family History


Family History: States: Unknown Family Hx





- Immunization History


Hx Tetanus Toxoid Vaccination: No


Hx Influenza Vaccination: No


Hx Pneumococcal Vaccination: No





- Home Medications


Home Medications: 


 Ambulatory Orders











 Medication  Instructions  Recorded


 


Acetaminophen with Codeine 1 tab PO Q6 PRN 09/18/18





[Tylenol with Codeine #3 Tablet]  


 


NIFEdipine ER [Procardia XL] 60 mg PO DAILY 09/18/18


 


Phenytoin, Extended [Dilantin] 100 mg PO Q8 09/18/18


 


Zolpidem [Ambien] 10 mg PO HS 09/18/18


 


traMADol [Ultram] 50 mg PO Q12 PRN 09/18/18














- Allergies


Allergies/Adverse Reactions: 


 Allergies











Allergy/AdvReac Type Severity Reaction Status Date / Time


 


No Known Allergies Allergy   Verified 08/05/18 21:01














Review of Systems


Review Of Systems: ROS cannot be obtained secondary to pt's inabilty to answer 

questions.


Respiratory: Positive for: Shortness of Breath





Physical Exam





- Reviewed


Nursing Documentation Reviewed: Yes


Vital Signs Reviewed: Yes





- Physical Exam


Appears: Positive for: Uncomfortable


Head Exam: Positive for: ATRAUMATIC, NORMOCEPHALIC


Skin: Positive for: Normal Color, Warm, Dry


Eye Exam: Positive for: Normal appearance, PERRL


ENT: Positive for: Normal ENT Inspection


Neck: Positive for: Painless ROM, Supple


Cardiovascular/Chest: Positive for: Regular Rate, Rhythm.  Negative for: Murmur


Respiratory: Positive for: Decreased Breath Sounds (coarse breath sounds 

bilaterally), Other (coarse b/l)


Gastrointestinal/Abdominal: Positive for: Normal Exam, Soft.  Negative for: 

Tenderness, Guarding, Rebound


Back: Positive for: Normal Inspection.  Negative for: L CVA Tenderness, R CVA 

Tenderness


Extremity: Positive for: Normal ROM (moving at will).  Negative for: Tenderness

, Pedal Edema, Deformity, Swelling


Neurologic/Psych: Positive for: Alert, Other (She opens mouth and is able to 

lift arms and legs. Patient is uncooperative and only responds to questions & 

commands she wishes to.).  Negative for: Motor/Sensory Deficits, Aphasia, 

Facial Droop





- Laboratory Results


Result Diagrams: 


 09/18/18 11:38





 09/18/18 11:38


Interpretation Of Abn Labs: 8 k





- ECG


ECG: Positive for: Interpreted By Me, Viewed By Me


Interpretation Of Abn EKG: peak T waves; nonspecific changes


O2 Sat by Pulse Oximetry: 99 (RA)


Pulse Ox Interpretation: Normal





- Radiology


X-Ray: Interpreted by Me, Viewed By Me


X-Ray Interpretation: Cardiomegaly, Other (vascular congestion)





- Progress


ED Course And Treament: 








1235:  Spoke with Dr. Ramos for ICU.  Will admit.  Antibiotics already given, 

but he will investigate further for source of possible infection after dialysis 

finished.  





1256:  Stable.  AAOx3.  Smiling and communicating.  Spoke with Dr. Gonzalez.  

Will consult and give dialysis orders on the floor.  Agree with current tx.








- Critical Care


Total Time (In Min): 60


Documented Critical Care: Time excludes all time spent performint seperately 

billable procedures





Medical Decision Making


Medical Decision Making: 





Time: 10:52


Initial Impression: Shortness of breath





Initial Plan:





--ABG


--EKG


--B-Type Natriuretic Peptide


--CMP


--Dilantin


--Magnesium


--Phosphorus


--Troponin I


--CBC w/ differential


--PTT


--PT


--Chest portable [RAD]


--Duoneb 3 ml INH


--Sodium Chloride 500 ml  mls/hr


--Tylenol 325 mg tab 975 mg IL


--Blood culture


--Urine culture


--Urinalysis 


--Reevaluation








11:40


-Potassium on ABG 7.2, pH 7.46, pO2 108, pCO2 38. 





12:10


-Patient got treatment for hyperkalemia based on the ABG potassium and peaked T 

waves. Amp of bicarbonate, amp of calcium gluconate and amp of D50.





12:15


-Post treatment for potassium, patient fully awake, alert and communicated. She 

is requesting Tylenol orally.





--------------------------------------------------------------------------------

-----





Scribe Attestation:


Documented by Reji Blum, acting as a scribe for Galdino Fuchs MD.





Provider Scribe Attestation:


All medical record entries made by the Scribe were at my direction and 

personally dictated by me. I have reviewed the chart and agree that the record 

accurately reflects my personal performance of the history, physical exam, 

medical decision making, and the department course for this patient. I have 

also personally directed, reviewed, and agree with the discharge instructions 

and disposition.








Disposition





- Clinical Impression


Clinical Impression: 


 Acute-on-chronic renal failure, CHF exacerbation, Sepsis, Hyperkalemia








- Patient ED Disposition


Is Patient to be Admitted: Yes


Counseled Patient/Family Regarding: Studies Performed, Diagnosis





- Disposition


Disposition Time: 12:45


Condition: GUARDED





- Pt Status Changed To:


Hospital Disposition Of: Inpatient





- Admit Certification


Admit to Inpatient:: After my assessment, the patient will require 

hospitalization for at least two midnights.  This is because of the severity of 

symptoms shown, intensity of services needed, and/or the medical risk in this 

patient being treated as an outpatient.





- POA


Present On Arrival: None

## 2018-09-18 NOTE — CARD
--------------- APPROVED REPORT --------------





Date of service: 09/18/2018



<Conclusion>

Sinus rhythm with 1st degree AV block

Nonspecific intraventricular conduction delay

Nonspecific ST abnormality

Abnormal ECG

## 2018-09-19 LAB
ALBUMIN SERPL-MCNC: 3.4 G/DL (ref 3.5–5)
ALBUMIN/GLOB SERPL: 1 {RATIO} (ref 1–2.1)
ALT SERPL-CCNC: 27 U/L (ref 9–52)
AST SERPL-CCNC: 33 U/L (ref 14–36)
BACTERIA #/AREA URNS HPF: (no result) /[HPF]
BASOPHILS # BLD AUTO: 0 K/UL (ref 0–0.2)
BASOPHILS NFR BLD: 0.3 % (ref 0–2)
BILIRUB UR-MCNC: NEGATIVE MG/DL
BUN SERPL-MCNC: 31 MG/DL (ref 7–17)
CALCIUM SERPL-MCNC: 8.7 MG/DL (ref 8.4–10.2)
COLOR UR: YELLOW
EOSINOPHIL # BLD AUTO: 0 K/UL (ref 0–0.7)
EOSINOPHIL NFR BLD: 0 % (ref 0–4)
ERYTHROCYTE [DISTWIDTH] IN BLOOD BY AUTOMATED COUNT: 17.5 % (ref 11.5–14.5)
GFR NON-AFRICAN AMERICAN: 8
GLUCOSE UR STRIP-MCNC: (no result) MG/DL
HGB BLD-MCNC: 10.2 G/DL (ref 12–16)
LEUKOCYTE ESTERASE UR-ACNC: (no result) LEU/UL
LYMPHOCYTES # BLD AUTO: 1.2 K/UL (ref 1–4.3)
LYMPHOCYTES NFR BLD AUTO: 6.5 % (ref 20–40)
MCH RBC QN AUTO: 26.9 PG (ref 27–31)
MCHC RBC AUTO-ENTMCNC: 31.8 G/DL (ref 33–37)
MCV RBC AUTO: 84.6 FL (ref 81–99)
MONOCYTES # BLD: 1 K/UL (ref 0–0.8)
MONOCYTES NFR BLD: 5.7 % (ref 0–10)
NEUTROPHILS # BLD: 16 K/UL (ref 1.8–7)
NEUTROPHILS NFR BLD AUTO: 87.5 % (ref 50–75)
NRBC BLD AUTO-RTO: 0 % (ref 0–0)
PH UR STRIP: 8 [PH] (ref 5–8)
PLATELET # BLD: 172 K/UL (ref 130–400)
PMV BLD AUTO: 9.4 FL (ref 7.2–11.7)
PROT UR STRIP-MCNC: 100 MG/DL
RBC # BLD AUTO: 3.81 MIL/UL (ref 3.8–5.2)
RBC # UR STRIP: (no result) /UL
SP GR UR STRIP: 1.01 (ref 1–1.03)
URINE CLARITY: (no result)
UROBILINOGEN UR-MCNC: (no result) MG/DL (ref 0.2–1)
WBC # BLD AUTO: 18.3 K/UL (ref 4.8–10.8)

## 2018-09-19 RX ADMIN — PANTOPRAZOLE SODIUM SCH MG: 40 TABLET, DELAYED RELEASE ORAL at 08:37

## 2018-09-19 NOTE — US
Date of service: 



09/19/2018



PROCEDURE:  Bilateral lower extremity venous duplex Doppler. 



HISTORY:

R/O acute DVT



COMPARISON:

None available. 



TECHNIQUE:

Bilateral common femoral, superficial femoral, popliteal and 

posterior tibial veins were evaluated. Flow was assessed with color 

Doppler, compressibility, assessment of phasic flow and augmentation 

response. 



FINDINGS:



COMMON FEMORAL VEIN:

Right CFV:  Unremarkable.



Left CFV:  Unremarkable.



SUPERFICIAL FEMORAL VEIN:

Right SFV: Unremarkable.



Left SFV:  Unremarkable.



POPLITEAL VEIN:

Right Popliteal:  Unremarkable.



Left Popliteal:  Unremarkable.



POSTERIOR TIBIAL VEIN:

Right PTV: Unremarkable.



Left PTV: Unremarkable.



OTHER FINDINGS:

None.



IMPRESSION:

No evidence of deep venous thrombosis.

## 2018-09-19 NOTE — CP.PCM.CON
History of Present Illness





- History of Present Illness


History of Present Illness: 





's patient who is 72 years of age female known to me with end stage renal 

disease on maintenance hemodialysis Monday Wednesday Friday she presented to 

the emergency room complaining of shortness of breath. She was picked up by the 

ambulance and she was diverticular to come to the emergency room because of 

shortness of breath and near potassium in the emergency room was quite 

elevated. Patient was given medication for hyperkalemia and urgent hemodialysis 

was performed and fluid removal approximately 3900 mL of fluid


PMH


72 Years old Female with PMHx of HTN, Diabetes, Gastritis, Chronic Kidney 

Disease, Seizures and End Stage Renal Disease (Hemodialysis M, W, F)


social history not contributory





Review of Systems





- Constitutional


Constitutional: Anorexia.  absent: Chills





- EENT


Eyes: absent: Exophthalmos


Nose/Mouth/Throat: absent: Epistaxis, Nasal Congestion





- Cardiovascular


Cardiovascular: As Per HPI, Dyspnea on Exertion, Leg Edema.  absent: Chest Pain

, Leg Ulcers





- Respiratory


Respiratory: Dyspnea.  absent: Hemoptysis





- Gastrointestinal


Gastrointestinal: Abdominal Pain.  absent: Coffee Ground Emesis





- Genitourinary


Genitourinary: Nocturia





- Musculoskeletal


Musculoskeletal: As Per HPI





- Integumentary


Integumentary: absent: Acne





- Endocrine


Endocrine: Fatigue





- Hematologic/Lymphatic


Hematologic: absent: Easy Bleeding





Past Patient History





- Infectious Disease


Hx of Infectious Diseases: None





- Past Medical History & Family History


Past Medical History?: Yes





- Past Social History


Smoking Status: Never Smoked





- CARDIAC


Hx Cardiac Disorders: Yes


Hx Angina: No


Hx Atrial Fibrillation: No


Hx Cardia Arrhythmia: No


Hx Circulatory Problems: No


Hx Congestive Heart Failure: Yes


Hx Heart Attack: No


Hx Heart Murmur: No


Hx Heart Transplant: No


Hx Hypercholesterolemia: No


Hx Hypertension: Yes


Hx Hypotension: No


Hx Internal Defibrillator: No


Hx Mitral Valve Prolapse: No


Hx Pacemaker: No


Hx Peripheral Edema: No


Hx Peripheral Vascular Disease: No





- PULMONARY


Hx Respiratory Disorders: Yes


Hx Asthma: No


Hx Bronchitis: No


Hx Chronic Obstructive Pulmonary Disease (COPD): No


Hx Emphysema: No


Hx Lung Cancer: No


Hx Pneumonia: No


Hx Pulmonary Edema: No


Hx Pulmonary Embolism: No


Hx Respiratory Aspiration: No


Hx Respiratory Tract Infection: No


Hx Sleep Apnea: No


Hx Tuberculosis: No


Other/Comment: SOB





- NEUROLOGICAL


Hx Neurological Disorder: Yes


Hx Alzheimer's Disease: No


HX Cerebrovascular Accident: No


Hx Dementia: No


Hx Dizziness: No


Hx Meningitis: No


Hx Migraine: No


Hx Multiple Sclerosis: No


Hx Paralysis: No


Hx Parkinson's Disease: No


Hx Seizures: Yes


Hx Syncope: No


Hx Transient Ischemic Attacks (TIA): No


Hx Vertigo: No


Other/Comment: CONFUSED





- HEENT


Hx HEENT Problems: Yes


Hx Blind: No


Hx Cataracts: No


Hx Deafness: No


Hx Difficulty Chewing: No


Hx Epistaxis: No


Hx Glaucoma: No


Hx Macular Degeneration: No


Hx Sinusitis: No


Other/Comment: GLASSES





- RENAL


Hx Chronic Kidney Disease: Yes


Hx Dialysis: Yes (TUES, THURS, SAT)


Type of Dialysis Access: LEFT AV SHUNT


Hx Kidney Stones: No


Hx Neurogenic Bladder: No


Hx Pyelonephritis: No


Hx Renal (Kidney) Cancer: No


Hx Renal Failure: Yes





- ENDOCRINE/METABOLIC


Hx Endocrine Disorders: Yes


Hx Adrenal Cancer: No


Hx Diabetes Insipidus: No


Hx Diabetes Mellitus Type 1: No


Hx Diabetes Mellitus Type 2: Yes


Hx Hyperthyroidism: No


Hx Hypothyroidism: No


Hx Systemic Lupus Erythematosus: No





- HEMATOLOGICAL/ONCOLOGICAL


Hx Blood Disorders: No


Hx AIDS: No


Hx Anemia: No


Hx Blood Transfusions: No (UNKNOWN, CONFUSED)


Hx Blood Transfusion Reaction: No


Hx Bruising: No


Hx Cancer: No


Hx Chemotherapy: No


Hx Cirrhosis: No


Hx Gum Bleeding: No


Hx Hemophilia: No


Hx Hepatitis A: No


Hx Hepatitis B: No


Hx Hepatitis C: No


Hx Human Immunodeficiency Virus (HIV): No


Hx Leukemia: No


Hx Metastesis: No


Hx Shingles: No


Hx Sickle Cell Disease: No


Hx Unexplained Bleeding: No


Hx von Willebrand's Disease: No





- INTEGUMENTARY


Hx Dermatological Problems: No


Hx Basil Cell: No


Hx Burns: No


Hx Cellulitis: No


Hx Eczema: No


Hx Melanoma: No


Hx Psoriasis: No


Hx Squamous Cell: No





- MUSCULOSKELETAL/RHEUMATOLOGICAL


Hx Musculoskeletal Disorders: Yes


Hx Arthritis: No


Hx Back Pain: No


Hx Degenerative Joint Disease: No


Hx Falls: No (unknwon)


Hx Fractures: No


Hx Gout: No


Hx Herniated Disk: No


Hx Myasthenia Gravis: No


Hx Osteoarthritis: No


Hx Osteomyelitis: No


Hx Osteoporosis: No


Hx Rhabdomyolysis: No


Hx Rheumatoid Arthritis: No


Hx Spinal Stenosis: No


Hx Unsteady Gait: Yes





- GASTROINTESTINAL


Hx Gastrointestinal Disorders: Yes


Hx Bowel Surgery: No


Hx Clostridium Difficile: No


Hx Colitis: No


Hx Colostomy: No


Hx Constipation: No


Hx Crohn's Disease: No


Hx Diarrhea: No


Hx Diverticulitis: No


Hx Esophageal Varices: No


Hx Fatty Liver Disease: No


Hx Gall Bladder Disease: No


Hx Gastritis: Yes


Hx Gastroesophageal Reflux: No


Hx Hemorrhoids: No


Hx Ileostomy: No


Hx Irritable Bowel: No


Hx Liver Failure: No


Hx Nausea: No


Hx Pancreatitis: No


HX Swallowing Problems: No


Hx Ulcer: No





- GENITOURINARY/GYNECOLOGICAL


Hx Genitourinary Disorders: Yes


Hx Bladder Cancer: No


Hx Bladder Stone: No


Hx Cervical Cancer: No


Hx Hematuria: No


Hx Incontinence: No


Hx Ovarian Cancer: Yes


Hx Postmenopausal Bleeding: No


Hx Reproductive Disorders: No


Hx Sexually Transmitted Disorders: No


Hx Uterine Cancer: No


Hx Urinary Tract Infection: No





- PSYCHIATRIC


Hx Psychophysiologic Disorder: Yes


Hx Anxiety: No


Hx Bipolar Disorder: No


Hx Depression: No


Hx Emotional Abuse: No


Hx Hallucinations: No


Hx Panic Symptoms: No


Hx Paranoia: No


Hx Post Traumatic Stress Disorder: No


Hx Psychosis: No


Hx Physical Abuse: No


Hx Schizophrenia: No


Hx Sexual Abuse: No


Hx Substance Use: No


Other/Comment: CONFUSED





- SURGICAL HISTORY


Hx Surgeries: Yes


Hx Abdominal Aortic Aneurysm Repair: No


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





- ANESTHESIA


Hx Anesthesia: Yes


Hx Anesthesia Reactions: No


Hx Malignant Hyperthermia: No


Has any member of the family had a problem w/ anesthesia?: No (UNKNOWN)





Meds


Allergies/Adverse Reactions: 


 Allergies











Allergy/AdvReac Type Severity Reaction Status Date / Time


 


No Known Allergies Allergy   Verified 08/05/18 21:01














- Medications


Medications: 


 Current Medications





Acetaminophen (Tylenol 325mg Tab)  650 mg PO Q6 PRN


   PRN Reason: Pain, Mild (1-3)


   Last Admin: 09/18/18 22:48 Dose:  650 mg


Albuterol/Ipratropium (Duoneb 3 Mg/0.5 Mg (3 Ml) Ud)  3 ml INH RQ6 PRN


   PRN Reason: Shortness of Breath


Heparin Sodium (Porcine) (Heparin)  5,000 units SC Q8 BOWEN


   PRN Reason: Protocol


   Last Admin: 09/19/18 08:37 Dose:  5,000 units


Piperacillin Sod/Tazobactam (Sod 2.25 gm/ Sodium Chloride)  100 mls @ 100 mls/

hr IVPB Q8 BOWEN


   PRN Reason: Protocol


Ketorolac Tromethamine (Toradol)  30 mg IVP Q6 PRN


   PRN Reason: Pain, moderate (4-7)


   Last Admin: 09/19/18 08:36 Dose:  30 mg


Ondansetron HCl (Zofran Inj)  4 mg IVP Q6 PRN


   PRN Reason: Nausea/Vomiting


   Last Admin: 09/18/18 20:45 Dose:  4 mg


Pantoprazole Sodium (Protonix Ec Tab)  40 mg PO DAILY [facility]


   Last Admin: 09/19/18 08:37 Dose:  40 mg


Phenytoin Sodium (Dilantin)  100 mg PO TID [facility]


   Last Admin: 09/19/18 08:36 Dose:  100 mg











Physical Exam





- Constitutional


Appears: No Acute Distress





- Eye Exam


Eye Exam: Conjunctival injection





- ENT Exam


ENT Exam: Mucous Membranes Moist





- Neck Exam


Neck exam: Negative for: Lymphadenopathy





- Respiratory Exam


Respiratory Exam: Rhonchi, NORMAL BREATHING PATTERN.  absent: Chest Wall 

Tenderness





- Cardiovascular Exam


Cardiovascular Exam: REGULAR RHYTHM.  absent: Gallop, JVD, Rubs





- GI/Abdominal Exam


GI & Abdominal Exam: Distended, Guarding, Normal Bowel Sounds





- Extremities Exam


Extremities exam: Negative for: calf tenderness





- Back Exam


Back exam: absent: CVA tenderness (L), CVA tenderness (R)





- Neurological Exam


Neurological exam: Alert





- Psychiatric Exam


Psychiatric exam: Normal Affect





Results





- Vital Signs


Recent Vital Signs: 


 Last Vital Signs











Temp  99.1 F   09/19/18 00:05


 


Pulse  81   09/19/18 03:05


 


Resp  22   09/19/18 03:05


 


BP  125/49 L  09/19/18 03:05


 


Pulse Ox  96   09/19/18 03:05














- Labs


Result Diagrams: 


 09/19/18 04:30





 09/19/18 04:30


Labs: 


 Laboratory Results - last 24 hr











  09/18/18 09/18/18 09/18/18





  11:00 11:24 11:24


 


WBC   


 


RBC   


 


Hgb   


 


Hct   


 


MCV   


 


MCH   


 


MCHC   


 


RDW   


 


Plt Count   


 


MPV   


 


Neut % (Auto)   


 


Lymph % (Auto)   


 


Mono % (Auto)   


 


Eos % (Auto)   


 


Baso % (Auto)   


 


Neut # (Auto)   


 


Lymph # (Auto)   


 


Mono # (Auto)   


 


Eos # (Auto)   


 


Baso # (Auto)   


 


Neutrophils % (Manual)   


 


Band Neutrophils %   


 


Lymphocytes % (Manual)   


 


Monocytes % (Manual)   


 


Platelet Estimate   


 


Hypochromasia (manual)   


 


Poikilocytosis (manual   


 


Anisocytosis (manual)   


 


Microcytosis (manual)   


 


Ovalocytes   


 


PT   10.6 


 


INR   1.0 


 


APTT   29.6 


 


pCO2    38


 


pO2    108 H


 


HCO3    27.3


 


ABG pH    7.46 H


 


ABG Total CO2    28.2 H


 


ABG O2 Saturation    98.6 H


 


ABG Base Excess    3.1 H


 


Bruce Test    Yes


 


ABG Potassium    7.2 H*


 


A-a O2 Difference    101.0


 


Sodium    131.0 L


 


Chloride    99.0


 


Glucose    109 H


 


Lactate    0.8


 


Vent Mode    Nc


 


FiO2    36.0


 


Blood Gas Comments    Nc 4lpm


 


Crit Value Called To    Daisy garcia


 


Crit Value Called By    Nadja


 


Crit Value Read Back    Y


 


Blood Gas Notified Time    1136


 


Potassium   


 


Carbon Dioxide   


 


Anion Gap   


 


BUN   


 


Creatinine   


 


Est GFR ( Amer)   


 


Est GFR (Non-Af Amer)   


 


POC Glucose (mg/dL)  112 H  


 


Random Glucose   


 


Lactic Acid   


 


Calcium   


 


Phosphorus   


 


Magnesium   


 


Total Bilirubin   


 


AST   


 


ALT   


 


Alkaline Phosphatase   


 


Troponin I   


 


NT-Pro-B Natriuret Pep   


 


Total Protein   


 


Albumin   


 


Globulin   


 


Albumin/Globulin Ratio   


 


Arterial Blood Potassium    7.2 H*


 


Phenytoin   


 


Hep Bs Antigen   


 


Hep Bs Antibody   


 


Hep B Core IgM Ab   














  09/18/18 09/18/18 09/18/18





  11:38 11:38 11:38


 


WBC  11.3 H  


 


RBC  4.43  


 


Hgb  12.1  


 


Hct  37.5  


 


MCV  84.6  D  


 


MCH  27.3  


 


MCHC  32.3 L  


 


RDW  17.7 H  


 


Plt Count  217  D  


 


MPV  9.3  


 


Neut % (Auto)  91.4 H  


 


Lymph % (Auto)  5.8 L  


 


Mono % (Auto)  2.3  


 


Eos % (Auto)  0.4  


 


Baso % (Auto)  0.1  


 


Neut # (Auto)  10.4 H  


 


Lymph # (Auto)  0.7 L  


 


Mono # (Auto)  0.3  


 


Eos # (Auto)  0.0  


 


Baso # (Auto)  0.0  


 


Neutrophils % (Manual)  88 H  


 


Band Neutrophils %  3 H  


 


Lymphocytes % (Manual)  7 L  


 


Monocytes % (Manual)  2  


 


Platelet Estimate  Normal  


 


Hypochromasia (manual)   


 


Poikilocytosis (manual   


 


Anisocytosis (manual)  Slight  


 


Microcytosis (manual)   


 


Ovalocytes   


 


PT   


 


INR   


 


APTT   


 


pCO2   


 


pO2   


 


HCO3   


 


ABG pH   


 


ABG Total CO2   


 


ABG O2 Saturation   


 


ABG Base Excess   


 


Bruce Test   


 


ABG Potassium   


 


A-a O2 Difference   


 


Sodium   136 


 


Chloride   96 L 


 


Glucose   


 


Lactate   


 


Vent Mode   


 


FiO2   


 


Blood Gas Comments   


 


Crit Value Called To   


 


Crit Value Called By   


 


Crit Value Read Back   


 


Blood Gas Notified Time   


 


Potassium   8.0 H* D 


 


Carbon Dioxide   25 


 


Anion Gap   23 H 


 


BUN   66 H 


 


Creatinine   9.1 H* D 


 


Est GFR ( Amer)   5 


 


Est GFR (Non-Af Amer)   4 


 


POC Glucose (mg/dL)   


 


Random Glucose   108 H 


 


Lactic Acid   


 


Calcium   9.0 


 


Phosphorus   6.7 H 


 


Magnesium   2.4 H 


 


Total Bilirubin   1.0 


 


AST   40 H 


 


ALT   27 


 


Alkaline Phosphatase   155 H 


 


Troponin I   0.0600 


 


NT-Pro-B Natriuret Pep   24150 H 


 


Total Protein   8.4 H 


 


Albumin   4.2 


 


Globulin   4.2 H 


 


Albumin/Globulin Ratio   1.0 


 


Arterial Blood Potassium   


 


Phenytoin    4.1 L


 


Hep Bs Antigen   


 


Hep Bs Antibody   


 


Hep B Core IgM Ab   














  09/18/18 09/18/18 09/18/18





  18:00 18:00 22:04


 


WBC    21.8 H D


 


RBC    4.20


 


Hgb    11.3 L


 


Hct    35.7


 


MCV    84.9


 


MCH    26.9 L


 


MCHC    31.7 L


 


RDW    17.5 H


 


Plt Count    196


 


MPV    9.1


 


Neut % (Auto)    90.2 H


 


Lymph % (Auto)    4.2 L


 


Mono % (Auto)    5.4


 


Eos % (Auto)    0.0


 


Baso % (Auto)    0.2


 


Neut # (Auto)    19.7 H


 


Lymph # (Auto)    0.9 L


 


Mono # (Auto)    1.2 H


 


Eos # (Auto)    0.0


 


Baso # (Auto)    0.0


 


Neutrophils % (Manual)    78 H


 


Band Neutrophils %    6 H


 


Lymphocytes % (Manual)    11 L


 


Monocytes % (Manual)    5


 


Platelet Estimate    Normal


 


Hypochromasia (manual)    Slight


 


Poikilocytosis (manual    Slight


 


Anisocytosis (manual)    Slight


 


Microcytosis (manual)    Slight


 


Ovalocytes    Slight


 


PT   


 


INR   


 


APTT   


 


pCO2   


 


pO2   


 


HCO3   


 


ABG pH   


 


ABG Total CO2   


 


ABG O2 Saturation   


 


ABG Base Excess   


 


Bruce Test   


 


ABG Potassium   


 


A-a O2 Difference   


 


Sodium   


 


Chloride   


 


Glucose   


 


Lactate   


 


Vent Mode   


 


FiO2   


 


Blood Gas Comments   


 


Crit Value Called To   


 


Crit Value Called By   


 


Crit Value Read Back   


 


Blood Gas Notified Time   


 


Potassium   


 


Carbon Dioxide   


 


Anion Gap   


 


BUN   


 


Creatinine   


 


Est GFR ( Amer)   


 


Est GFR (Non-Af Amer)   


 


POC Glucose (mg/dL)   


 


Random Glucose   


 


Lactic Acid   


 


Calcium   


 


Phosphorus   


 


Magnesium   


 


Total Bilirubin   


 


AST   


 


ALT   


 


Alkaline Phosphatase   


 


Troponin I   


 


NT-Pro-B Natriuret Pep   


 


Total Protein   


 


Albumin   


 


Globulin   


 


Albumin/Globulin Ratio   


 


Arterial Blood Potassium   


 


Phenytoin   


 


Hep Bs Antigen  Negative  


 


Hep Bs Antibody   Negative 


 


Hep B Core IgM Ab  Negative  














  09/18/18 09/18/18 09/19/18





  22:04 22:04 04:30


 


WBC    18.3 H


 


RBC    3.81


 


Hgb    10.2 L


 


Hct    32.2 L


 


MCV    84.6


 


MCH    26.9 L


 


MCHC    31.8 L


 


RDW    17.5 H


 


Plt Count    172


 


MPV    9.4


 


Neut % (Auto)    87.5 H


 


Lymph % (Auto)    6.5 L


 


Mono % (Auto)    5.7


 


Eos % (Auto)    0.0


 


Baso % (Auto)    0.3


 


Neut # (Auto)    16.0 H


 


Lymph # (Auto)    1.2


 


Mono # (Auto)    1.0 H


 


Eos # (Auto)    0.0


 


Baso # (Auto)    0.0


 


Neutrophils % (Manual)   


 


Band Neutrophils %   


 


Lymphocytes % (Manual)   


 


Monocytes % (Manual)   


 


Platelet Estimate   


 


Hypochromasia (manual)   


 


Poikilocytosis (manual   


 


Anisocytosis (manual)   


 


Microcytosis (manual)   


 


Ovalocytes   


 


PT   


 


INR   


 


APTT   


 


pCO2   


 


pO2   


 


HCO3   


 


ABG pH   


 


ABG Total CO2   


 


ABG O2 Saturation   


 


ABG Base Excess   


 


Bruce Test   


 


ABG Potassium   


 


A-a O2 Difference   


 


Sodium  138  


 


Chloride  92 L  


 


Glucose   


 


Lactate   


 


Vent Mode   


 


FiO2   


 


Blood Gas Comments   


 


Crit Value Called To   


 


Crit Value Called By   


 


Crit Value Read Back   


 


Blood Gas Notified Time   


 


Potassium  4.3  


 


Carbon Dioxide  31 H  


 


Anion Gap  19  


 


BUN  25 H  


 


Creatinine  4.7 H  


 


Est GFR ( Amer)  11  


 


Est GFR (Non-Af Amer)  9  


 


POC Glucose (mg/dL)   


 


Random Glucose  127 H  


 


Lactic Acid   2.4 H 


 


Calcium  9.3  


 


Phosphorus  5.2 H  


 


Magnesium  2.0  


 


Total Bilirubin   


 


AST   


 


ALT   


 


Alkaline Phosphatase   


 


Troponin I   


 


NT-Pro-B Natriuret Pep   


 


Total Protein   


 


Albumin   


 


Globulin   


 


Albumin/Globulin Ratio   


 


Arterial Blood Potassium   


 


Phenytoin   


 


Hep Bs Antigen   


 


Hep Bs Antibody   


 


Hep B Core IgM Ab   














  09/19/18





  04:30


 


WBC 


 


RBC 


 


Hgb 


 


Hct 


 


MCV 


 


MCH 


 


MCHC 


 


RDW 


 


Plt Count 


 


MPV 


 


Neut % (Auto) 


 


Lymph % (Auto) 


 


Mono % (Auto) 


 


Eos % (Auto) 


 


Baso % (Auto) 


 


Neut # (Auto) 


 


Lymph # (Auto) 


 


Mono # (Auto) 


 


Eos # (Auto) 


 


Baso # (Auto) 


 


Neutrophils % (Manual) 


 


Band Neutrophils % 


 


Lymphocytes % (Manual) 


 


Monocytes % (Manual) 


 


Platelet Estimate 


 


Hypochromasia (manual) 


 


Poikilocytosis (manual 


 


Anisocytosis (manual) 


 


Microcytosis (manual) 


 


Ovalocytes 


 


PT 


 


INR 


 


APTT 


 


pCO2 


 


pO2 


 


HCO3 


 


ABG pH 


 


ABG Total CO2 


 


ABG O2 Saturation 


 


ABG Base Excess 


 


Bruce Test 


 


ABG Potassium 


 


A-a O2 Difference 


 


Sodium  136


 


Chloride  93 L


 


Glucose 


 


Lactate 


 


Vent Mode 


 


FiO2 


 


Blood Gas Comments 


 


Crit Value Called To 


 


Crit Value Called By 


 


Crit Value Read Back 


 


Blood Gas Notified Time 


 


Potassium  4.5


 


Carbon Dioxide  32 H


 


Anion Gap  16


 


BUN  31 H


 


Creatinine  5.3 H


 


Est GFR ( Amer)  10


 


Est GFR (Non-Af Amer)  8


 


POC Glucose (mg/dL) 


 


Random Glucose  146 H


 


Lactic Acid 


 


Calcium  8.7


 


Phosphorus 


 


Magnesium 


 


Total Bilirubin  0.7


 


AST  33


 


ALT  27


 


Alkaline Phosphatase  128 H


 


Troponin I 


 


NT-Pro-B Natriuret Pep 


 


Total Protein  7.1


 


Albumin  3.4 L


 


Globulin  3.6


 


Albumin/Globulin Ratio  1.0


 


Arterial Blood Potassium 


 


Phenytoin 


 


Hep Bs Antigen 


 


Hep Bs Antibody 


 


Hep B Core IgM Ab 














Assessment & Plan


(1) Acute pulmonary edema


Status: Acute   Priority: High   





(2) Hyperkalemia


Status: Acute   Priority: High   





(3) Sepsis


Status: Acute   





(4) Chronic kidney disease with end stage renal failure on dialysis


Assessment and Plan: 


end stage renal disease


Admitted with severe hyperkalemia and volume overloaded the require urgent 

dialysis last night


#2 patient noted to have abdominal pain and somewhat distention of the abdomen 

and patient to go for a stat ultrasound of the abdomen and possibly CT scan of 

the abdomen


Empiric antibiotics patient was given vancomycin and Zosyn was added waiting 

for the ID


Leukocytosis


History of hyperphosphatemia and secondary hyperparathyroidism


Status: Acute

## 2018-09-19 NOTE — CP.CCUPN
CCU Subjective





- Physician Review


Subjective (Free Text): 


Eyes open with verbal stimuli, but appears confused and disoriented, underwent 

HD yesterday with 2.9 L. removed, on nasal cannula now in no resp distress. 

Repeat K levels have improved, EKG shows normalization of Ts. No recurrent 

fevers since max of 102.6F on admission. 





Other vitals and I/O's reviewed. Bp 126/74, HR 90, RR 22, SPO2 98% on nasal 

cannula.





ROS:  No other pertinent negs or positives on 10+  system review- unobtainable 

due to confusional status.








PMSFH:   All other Nursing and physician documentation reviewed to date; no new 

pertinent info noted relevant to current medical problems.





EXAM- 


HEENT:  no icterus, no gaze preference, Pupils 3 mm and reactive


NECK: No JVD visible, supple, carotids equal upstroke bilat/no bruits


CHEST: decreased BS bases, no wheezes audible


HEART:  regular, distant S1S2, no rubs or murmurs audible. 


ABD:  softly distended, BS hypoactive, neg rebound or guarding, but grimaces 

with palpable tenderness over epigastric and periumbilical area. 


EXT:   + edema, no cyanosis; no calf tenderness or palpable cords, distal 

pulses intact and symmetrical. 


NEURO:   + tone all extremities, no gross focal deficits. 


SKIN:   no rashes,  warm and dry.








LABS:  


WBC= 18.3


HGB= 10.2   


PLTs= 172K





Alk Phos =128


Trop#1 negative


Lactate= 2.4 yesterday





Na= 136


K= 4.5


CL= 93


HCO3=32  


BUN/Cr= 31/5.3 


BS= 146





CXR:  yesterday-  bi-hilar congestive interstitial changes, no gross 

consolidation (my interp)


EKG:  yesterday done 3 H later from admission- (my interp)  sinus 87/min with 1

st AVB, poor R progression anteriorly, T waves normalized compared to admission 

study. 





IMPRESSION / MAJOR PROBLEMS NOW:  


1.    AMS 2 metabolic encephalopathy; r/o Sepsis Encephalopathy; occult 

Seizure activity,  CVA


2.    Fluid Overload state 2 missed HD with h/o ESRD


3.    Severe Sepsis without Shock state, r/o Intra-abdominal; Lung pathology


4.    h/o Seizure Disorder on Dilantin








PLAN:


1.   Improved cardiopulm status, check for recent ECHO for LV fx. 


2.   US Abd ordered as per Nephrology. If no significant pathology seen, will 

order CTAP, and include CT chest to look for occult pneumonitis changes not 

visible on plain CXR, especially hilar regions. 


3.   Check repeat LFTs, Alk Phos4


4.   Blood Cx sterile to date.


5.    Empiric Abx as per ID.

## 2018-09-19 NOTE — HP
HISTORY OF PRESENT ILLNESS:  The patient is seen in the emergency room

before being admitted to Intensive Care Unit.  This is a 72 years old

 female with past medical history of hypertension, end-stage renal

disease on hemodialysis, type 2 diabetes mellitus, seizure disorder, was

brought to emergency room by EMS for shortness of breath.  The patient was

scheduled for hemodialysis today, but when the transport came to pick the

patient for dialysis, she was noticed to be in distress, complaining of

shortness of breath and the patient was brought to emergency room.  The

patient was evaluated in the emergency room and she was found to have

elevated potassium with peak T-wave in electrocardiogram.  The patient was

given calcium gluconate, insulin and D50.  The patient also was noticed to

have low-grade fever in the emergency room.  Antibiotics, both vancomycin

and Zosyn was given.  No source of infection could be identified on

patient's evaluation in the emergency room.  Other review of system is

negative.



ALLERGIES:  NO KNOWN ALLERGY.



MEDICATIONS:  As per MAR, reviewed and ordered.



SOCIAL HISTORY:  No history of smoking, EtOH or substance abuse.



FAMILY HISTORY:  Not contributory.



PHYSICAL EXAMINATION:

GENERAL:  The patient is in bed, not in any cardiopulmonary distress at the

time of this examination.

VITAL SIGNS:  Blood pressure 148/68, temperature 100.1, respiratory rate 21

and pulse 90.

HEENT:  Pupils equal, reactive to light.  Normal-appearing mucosa of the

conjunctivae, oropharynx, and nasal membrane mucosa.

NECK:  Supple.  No JVD.  No carotid bruit.  No lymph nodes.  No

thyromegaly.

CHEST AND LUNGS:  Bilateral symmetrical expansion.  Good air exchange.  No

rales, no rhonchi.

CARDIOVASCULAR SYSTEM:  PMI not localized.  S1, S2.  _____ murmur.

ABDOMEN:  Normoactive bowel sounds.  No tenderness.  No organomegaly.  No

masses.

EXTREMITIES:  No cyanosis, no clubbing, no edema.  Left shunt is

functioning,

CNS:  Alert, awake, oriented x1.  No neurological deficit could be

appreciated.



ASSESSMENT:

1.  End-stage renal disease, missed hemodialysis, presented with

hyperkalemia and pulmonary congestion.

2.  Rule out fever with no apparent source of infection.

3.  Seizure disorder.

4.  Hypertension.



PLAN:  Stat hemodialysis was ordered, and the patient to be admitted to

ICU.  Discussed with intensivist.  We will also order venous Doppler of

both upper and lower extremities.  Nephrology consult.  ID consult.







__________________________________________

[facility] MD Hair





DD:  09/18/2018 19:32:23

DT:  09/18/2018 19:35:01

Job # [id number]

## 2018-09-19 NOTE — CARD
--------------- APPROVED REPORT --------------





Date of service: 09/18/2018



EKG Measurement

Heart [id number]

[place] [id number]

[id number]

[id number]

[id number]



<Conclusion>

Sinus rhythm with 1st degree AV block

Possible Left atrial enlargement

Borderline ECG

## 2018-09-19 NOTE — CP.PCM.PN
Subjective





- Date & Time of Evaluation


Date of Evaluation: 09/19/18


Time of Evaluation: 13:22





- Subjective


Subjective: 





ID note-





Pt. seen and examined today in ICU.


Pt. much more awake and alert today and in good spirits.








She states she feels much better and much less sob.


denies any fever.


She still c/o abdominal pain.








Objective





- Vital Signs/Intake and Output


Vital Signs (last 24 hours): 


 











Temp Pulse Resp BP Pulse Ox


 


 97.9 F   90   19   120/65   93 L


 


 09/19/18 12:23  09/19/18 12:23  09/19/18 12:23  09/19/18 12:23  09/19/18 12:23











- Medications


Medications: 


 Current Medications





Acetaminophen (Tylenol 325mg Tab)  650 mg PO Q6 PRN


   PRN Reason: Pain, Mild (1-3)


   Last Admin: 09/18/18 22:48 Dose:  650 mg


Albuterol/Ipratropium (Duoneb 3 Mg/0.5 Mg (3 Ml) Ud)  3 ml INH RQ6 PRN


   PRN Reason: Shortness of Breath


Epoetin Arvind (Procrit)  4,000 unit IV TTS [facility]


Heparin Sodium (Porcine) (Heparin)  5,000 units SC Q8 BOWEN


   PRN Reason: Protocol


   Last Admin: 09/19/18 08:37 Dose:  5,000 units


Piperacillin Sod/Tazobactam (Sod 2.25 gm/ Sodium Chloride)  100 mls @ 100 mls/

hr IVPB Q8 BOWEN


   PRN Reason: Protocol


   Last Admin: 09/19/18 13:12 Dose:  100 mls/hr


Ketorolac Tromethamine (Toradol)  30 mg IVP Q6 PRN


   PRN Reason: Pain, moderate (4-7)


   Last Admin: 09/19/18 08:36 Dose:  30 mg


Ondansetron HCl (Zofran Inj)  4 mg IVP Q6 PRN


   PRN Reason: Nausea/Vomiting


   Last Admin: 09/18/18 20:45 Dose:  4 mg


Pantoprazole Sodium (Protonix Ec Tab)  40 mg PO DAILY [facility]


   Last Admin: 09/19/18 08:37 Dose:  40 mg


Phenytoin Sodium (Dilantin)  100 mg PO TID [facility]


   Last Admin: 09/19/18 13:12 Dose:  100 mg











- Labs


Labs: 


 








- Additional Findings


Additional findings: 





- Constitutional


Appears: No Acute Distress





- Head Exam


Head Exam: ATRAUMATIC





- Eye Exam


Eye Exam: EOMI, PERRL





- ENT Exam


ENT Exam: Normal Oropharynx





- Neck Exam


Neck exam: Positive for: Full Rom





- Respiratory Exam


Additional comments: 





bibasilar crackles


no wheezing


slight tachypnea





- Cardiovascular Exam


Cardiovascular Exam: RRR, +S1, +S2





- GI/Abdominal Exam


GI & Abdominal Exam: Normal Bowel Sounds, Soft


Additional comments: 





+ mild tenderness throughtout abdomen


no guarding, no rebound


no CVA tenderness





- Extremities Exam


Extremities exam: Positive for: normal inspection





- Neurological Exam


Additional comments: 





AAO x 3





 Laboratory Results - last 72 hr











  09/18/18 09/18/18 09/18/18





  11:00 11:24 11:24


 


WBC   


 


RBC   


 


Hgb   


 


Hct   


 


MCV   


 


MCH   


 


MCHC   


 


RDW   


 


Plt Count   


 


MPV   


 


Neut % (Auto)   


 


Lymph % (Auto)   


 


Mono % (Auto)   


 


Eos % (Auto)   


 


Baso % (Auto)   


 


Neut # (Auto)   


 


Lymph # (Auto)   


 


Mono # (Auto)   


 


Eos # (Auto)   


 


Baso # (Auto)   


 


Neutrophils % (Manual)   


 


Band Neutrophils %   


 


Lymphocytes % (Manual)   


 


Monocytes % (Manual)   


 


Platelet Estimate   


 


Hypochromasia (manual)   


 


Poikilocytosis (manual   


 


Anisocytosis (manual)   


 


Microcytosis (manual)   


 


Ovalocytes   


 


PT   10.6 


 


INR   1.0 


 


APTT   29.6 


 


pCO2    38


 


pO2    108 H


 


HCO3    27.3


 


ABG pH    7.46 H


 


ABG Total CO2    28.2 H


 


ABG O2 Saturation    98.6 H


 


ABG Base Excess    3.1 H


 


Bruce Test    Yes


 


ABG Potassium    7.2 H*


 


A-a O2 Difference    101.0


 


Sodium    131.0 L


 


Chloride    99.0


 


Glucose    109 H


 


Lactate    0.8


 


Vent Mode    Nc


 


FiO2    36.0


 


Blood Gas Comments    Nc 4lpm


 


Crit Value Called To    Daisy garcia


 


Crit Value Called By    Nadja


 


Crit Value Read Back    Y


 


Blood Gas Notified Time    1136


 


Potassium   


 


Carbon Dioxide   


 


Anion Gap   


 


BUN   


 


Creatinine   


 


Est GFR ( Amer)   


 


Est GFR (Non-Af Amer)   


 


POC Glucose (mg/dL)  112 H  


 


Random Glucose   


 


Lactic Acid   


 


Calcium   


 


Phosphorus   


 


Magnesium   


 


Total Bilirubin   


 


AST   


 


ALT   


 


Alkaline Phosphatase   


 


Troponin I   


 


NT-Pro-B Natriuret Pep   


 


Total Protein   


 


Albumin   


 


Globulin   


 


Albumin/Globulin Ratio   


 


Arterial Blood Potassium    7.2 H*


 


Urine Color   


 


Urine Clarity   


 


Urine pH   


 


Ur Specific Gravity   


 


Urine Protein   


 


Urine Glucose (UA)   


 


Urine Ketones   


 


Urine Blood   


 


Urine Nitrate   


 


Urine Bilirubin   


 


Urine Urobilinogen   


 


Ur Leukocyte Esterase   


 


Urine RBC (Auto)   


 


Urine Microscopic WBC   


 


Urine Bacteria   


 


Phenytoin   


 


Hep Bs Antigen   


 


Hep Bs Antibody   


 


Hep B Core IgM Ab   














  09/18/18 09/18/18 09/18/18





  11:38 11:38 11:38


 


WBC  11.3 H  


 


RBC  4.43  


 


Hgb  12.1  


 


Hct  37.5  


 


MCV  84.6  D  


 


MCH  27.3  


 


MCHC  32.3 L  


 


RDW  17.7 H  


 


Plt Count  217  D  


 


MPV  9.3  


 


Neut % (Auto)  91.4 H  


 


Lymph % (Auto)  5.8 L  


 


Mono % (Auto)  2.3  


 


Eos % (Auto)  0.4  


 


Baso % (Auto)  0.1  


 


Neut # (Auto)  10.4 H  


 


Lymph # (Auto)  0.7 L  


 


Mono # (Auto)  0.3  


 


Eos # (Auto)  0.0  


 


Baso # (Auto)  0.0  


 


Neutrophils % (Manual)  88 H  


 


Band Neutrophils %  3 H  


 


Lymphocytes % (Manual)  7 L  


 


Monocytes % (Manual)  2  


 


Platelet Estimate  Normal  


 


Hypochromasia (manual)   


 


Poikilocytosis (manual   


 


Anisocytosis (manual)  Slight  


 


Microcytosis (manual)   


 


Ovalocytes   


 


PT   


 


INR   


 


APTT   


 


pCO2   


 


pO2   


 


HCO3   


 


ABG pH   


 


ABG Total CO2   


 


ABG O2 Saturation   


 


ABG Base Excess   


 


Bruce Test   


 


ABG Potassium   


 


A-a O2 Difference   


 


Sodium   136 


 


Chloride   96 L 


 


Glucose   


 


Lactate   


 


Vent Mode   


 


FiO2   


 


Blood Gas Comments   


 


Crit Value Called To   


 


Crit Value Called By   


 


Crit Value Read Back   


 


Blood Gas Notified Time   


 


Potassium   8.0 H* D 


 


Carbon Dioxide   25 


 


Anion Gap   23 H 


 


BUN   66 H 


 


Creatinine   9.1 H* D 


 


Est GFR ( Amer)   5 


 


Est GFR (Non-Af Amer)   4 


 


POC Glucose (mg/dL)   


 


Random Glucose   108 H 


 


Lactic Acid   


 


Calcium   9.0 


 


Phosphorus   6.7 H 


 


Magnesium   2.4 H 


 


Total Bilirubin   1.0 


 


AST   40 H 


 


ALT   27 


 


Alkaline Phosphatase   155 H 


 


Troponin I   0.0600 


 


NT-Pro-B Natriuret Pep   47418 H 


 


Total Protein   8.4 H 


 


Albumin   4.2 


 


Globulin   4.2 H 


 


Albumin/Globulin Ratio   1.0 


 


Arterial Blood Potassium   


 


Urine Color   


 


Urine Clarity   


 


Urine pH   


 


Ur Specific Gravity   


 


Urine Protein   


 


Urine Glucose (UA)   


 


Urine Ketones   


 


Urine Blood   


 


Urine Nitrate   


 


Urine Bilirubin   


 


Urine Urobilinogen   


 


Ur Leukocyte Esterase   


 


Urine RBC (Auto)   


 


Urine Microscopic WBC   


 


Urine Bacteria   


 


Phenytoin    4.1 L


 


Hep Bs Antigen   


 


Hep Bs Antibody   


 


Hep B Core IgM Ab   














  09/18/18 09/18/18 09/18/18





  18:00 18:00 22:04


 


WBC    21.8 H D


 


RBC    4.20


 


Hgb    11.3 L


 


Hct    35.7


 


MCV    84.9


 


MCH    26.9 L


 


MCHC    31.7 L


 


RDW    17.5 H


 


Plt Count    196


 


MPV    9.1


 


Neut % (Auto)    90.2 H


 


Lymph % (Auto)    4.2 L


 


Mono % (Auto)    5.4


 


Eos % (Auto)    0.0


 


Baso % (Auto)    0.2


 


Neut # (Auto)    19.7 H


 


Lymph # (Auto)    0.9 L


 


Mono # (Auto)    1.2 H


 


Eos # (Auto)    0.0


 


Baso # (Auto)    0.0


 


Neutrophils % (Manual)    78 H


 


Band Neutrophils %    6 H


 


Lymphocytes % (Manual)    11 L


 


Monocytes % (Manual)    5


 


Platelet Estimate    Normal


 


Hypochromasia (manual)    Slight


 


Poikilocytosis (manual    Slight


 


Anisocytosis (manual)    Slight


 


Microcytosis (manual)    Slight


 


Ovalocytes    Slight


 


PT   


 


INR   


 


APTT   


 


pCO2   


 


pO2   


 


HCO3   


 


ABG pH   


 


ABG Total CO2   


 


ABG O2 Saturation   


 


ABG Base Excess   


 


Bruce Test   


 


ABG Potassium   


 


A-a O2 Difference   


 


Sodium   


 


Chloride   


 


Glucose   


 


Lactate   


 


Vent Mode   


 


FiO2   


 


Blood Gas Comments   


 


Crit Value Called To   


 


Crit Value Called By   


 


Crit Value Read Back   


 


Blood Gas Notified Time   


 


Potassium   


 


Carbon Dioxide   


 


Anion Gap   


 


BUN   


 


Creatinine   


 


Est GFR ( Amer)   


 


Est GFR (Non-Af Amer)   


 


POC Glucose (mg/dL)   


 


Random Glucose   


 


Lactic Acid   


 


Calcium   


 


Phosphorus   


 


Magnesium   


 


Total Bilirubin   


 


AST   


 


ALT   


 


Alkaline Phosphatase   


 


Troponin I   


 


NT-Pro-B Natriuret Pep   


 


Total Protein   


 


Albumin   


 


Globulin   


 


Albumin/Globulin Ratio   


 


Arterial Blood Potassium   


 


Urine Color   


 


Urine Clarity   


 


Urine pH   


 


Ur Specific Gravity   


 


Urine Protein   


 


Urine Glucose (UA)   


 


Urine Ketones   


 


Urine Blood   


 


Urine Nitrate   


 


Urine Bilirubin   


 


Urine Urobilinogen   


 


Ur Leukocyte Esterase   


 


Urine RBC (Auto)   


 


Urine Microscopic WBC   


 


Urine Bacteria   


 


Phenytoin   


 


Hep Bs Antigen  Negative  


 


Hep Bs Antibody   Negative 


 


Hep B Core IgM Ab  Negative  














  09/18/18 09/18/18 09/19/18





  22:04 22:04 04:30


 


WBC    18.3 H


 


RBC    3.81


 


Hgb    10.2 L


 


Hct    32.2 L


 


MCV    84.6


 


MCH    26.9 L


 


MCHC    31.8 L


 


RDW    17.5 H


 


Plt Count    172


 


MPV    9.4


 


Neut % (Auto)    87.5 H


 


Lymph % (Auto)    6.5 L


 


Mono % (Auto)    5.7


 


Eos % (Auto)    0.0


 


Baso % (Auto)    0.3


 


Neut # (Auto)    16.0 H


 


Lymph # (Auto)    1.2


 


Mono # (Auto)    1.0 H


 


Eos # (Auto)    0.0


 


Baso # (Auto)    0.0


 


Neutrophils % (Manual)   


 


Band Neutrophils %   


 


Lymphocytes % (Manual)   


 


Monocytes % (Manual)   


 


Platelet Estimate   


 


Hypochromasia (manual)   


 


Poikilocytosis (manual   


 


Anisocytosis (manual)   


 


Microcytosis (manual)   


 


Ovalocytes   


 


PT   


 


INR   


 


APTT   


 


pCO2   


 


pO2   


 


HCO3   


 


ABG pH   


 


ABG Total CO2   


 


ABG O2 Saturation   


 


ABG Base Excess   


 


Bruce Test   


 


ABG Potassium   


 


A-a O2 Difference   


 


Sodium  138  


 


Chloride  92 L  


 


Glucose   


 


Lactate   


 


Vent Mode   


 


FiO2   


 


Blood Gas Comments   


 


Crit Value Called To   


 


Crit Value Called By   


 


Crit Value Read Back   


 


Blood Gas Notified Time   


 


Potassium  4.3  


 


Carbon Dioxide  31 H  


 


Anion Gap  19  


 


BUN  25 H  


 


Creatinine  4.7 H  


 


Est GFR ( Amer)  11  


 


Est GFR (Non-Af Amer)  9  


 


POC Glucose (mg/dL)   


 


Random Glucose  127 H  


 


Lactic Acid   2.4 H 


 


Calcium  9.3  


 


Phosphorus  5.2 H  


 


Magnesium  2.0  


 


Total Bilirubin   


 


AST   


 


ALT   


 


Alkaline Phosphatase   


 


Troponin I   


 


NT-Pro-B Natriuret Pep   


 


Total Protein   


 


Albumin   


 


Globulin   


 


Albumin/Globulin Ratio   


 


Arterial Blood Potassium   


 


Urine Color   


 


Urine Clarity   


 


Urine pH   


 


Ur Specific Gravity   


 


Urine Protein   


 


Urine Glucose (UA)   


 


Urine Ketones   


 


Urine Blood   


 


Urine Nitrate   


 


Urine Bilirubin   


 


Urine Urobilinogen   


 


Ur Leukocyte Esterase   


 


Urine RBC (Auto)   


 


Urine Microscopic WBC   


 


Urine Bacteria   


 


Phenytoin   


 


Hep Bs Antigen   


 


Hep Bs Antibody   


 


Hep B Core IgM Ab   














  09/19/18 09/19/18 09/19/18





  04:30 09:39 10:00


 


WBC   


 


RBC   


 


Hgb   


 


Hct   


 


MCV   


 


MCH   


 


MCHC   


 


RDW   


 


Plt Count   


 


MPV   


 


Neut % (Auto)   


 


Lymph % (Auto)   


 


Mono % (Auto)   


 


Eos % (Auto)   


 


Baso % (Auto)   


 


Neut # (Auto)   


 


Lymph # (Auto)   


 


Mono # (Auto)   


 


Eos # (Auto)   


 


Baso # (Auto)   


 


Neutrophils % (Manual)   


 


Band Neutrophils %   


 


Lymphocytes % (Manual)   


 


Monocytes % (Manual)   


 


Platelet Estimate   


 


Hypochromasia (manual)   


 


Poikilocytosis (manual   


 


Anisocytosis (manual)   


 


Microcytosis (manual)   


 


Ovalocytes   


 


PT   


 


INR   


 


APTT   


 


pCO2   


 


pO2   


 


HCO3   


 


ABG pH   


 


ABG Total CO2   


 


ABG O2 Saturation   


 


ABG Base Excess   


 


Bruce Test   


 


ABG Potassium   


 


A-a O2 Difference   


 


Sodium  136  


 


Chloride  93 L  


 


Glucose   


 


Lactate   


 


Vent Mode   


 


FiO2   


 


Blood Gas Comments   


 


Crit Value Called To   


 


Crit Value Called By   


 


Crit Value Read Back   


 


Blood Gas Notified Time   


 


Potassium  4.5  


 


Carbon Dioxide  32 H  


 


Anion Gap  16  


 


BUN  31 H  


 


Creatinine  5.3 H  


 


Est GFR ( Amer)  10  


 


Est GFR (Non-Af Amer)  8  


 


POC Glucose (mg/dL)   


 


Random Glucose  146 H  


 


Lactic Acid   


 


Calcium  8.7  


 


Phosphorus    7.0 H


 


Magnesium   


 


Total Bilirubin  0.7  


 


AST  33  


 


ALT  27  


 


Alkaline Phosphatase  128 H  


 


Troponin I   


 


NT-Pro-B Natriuret Pep   


 


Total Protein  7.1  


 


Albumin  3.4 L  


 


Globulin  3.6  


 


Albumin/Globulin Ratio  1.0  


 


Arterial Blood Potassium   


 


Urine Color   Yellow 


 


Urine Clarity   Slighty-cloudy 


 


Urine pH   8.0 


 


Ur Specific Gravity   1.006 


 


Urine Protein   100 


 


Urine Glucose (UA)   Neg 


 


Urine Ketones   Negative 


 


Urine Blood   Trace 


 


Urine Nitrate   Negative 


 


Urine Bilirubin   Negative 


 


Urine Urobilinogen   0.2-1.0 


 


Ur Leukocyte Esterase   Neg 


 


Urine RBC (Auto)   4 H 


 


Urine Microscopic WBC   1 


 


Urine Bacteria   Rare 


 


Phenytoin   


 


Hep Bs Antigen   


 


Hep Bs Antibody   


 


Hep B Core IgM Ab   








 Microbiology





09/18/18 11:38   Blood   Blood Culture - Preliminary


                            NO GROWTH AFTER 24 HOURS





 





 














Assessment and Plan


(1) Chronic kidney disease with end stage renal failure on dialysis


Status: Acute   





(2) Acute-on-chronic renal failure


Status: Acute   





(3) Hyperkalemia


Status: Acute   





(4) Fever


Status: Acute   





- Assessment and Plan (Free Text)


Assessment: 





A/P-


72 year old female with PMH of DM II, ESRD on HD, admitted with sob and resp 

distress and fever.





clinically better today.


afebrile today


leukocytosis , however, has increased today


blood cx- negative x 1


UA- negative


high Pro-BNP





pneumonia vs CHF exacerbation


leukocytosis


fever


ESRD on HD








Plan-


check one more blood cx.


check sputum cx.


check mycoplasma serology and Legionella AG.


continue with zosyn and Vanco empirically as started by primary team to cover 

for healthcare associated pneumonia. day #2


keep vanco trough between 10-15.


add zithromax to cover for atypicals pending further results.


HD as per renal team








All above d/w patient at length and she verbalizes full understanding of all 

above.








ICU time 40 minutes.

## 2018-09-19 NOTE — US
Date of service: 



09/19/2018



HISTORY:

pain



COMPARISON:

None.



TECHNIQUE:

Sonographic evaluation of the abdomen.



FINDINGS:



LIVER:

Measures 15.1 cm.  Normal echogenicity of the liver parenchyma. There 

is a 3.0 x 2.0 x 2.5 cm simple cyst in the left hepatic lobe and 2.4 

x 2.5 x 2.4 cm simple cyst in the right hepatic lobe..  No 

intrahepatic bile duct dilatation.



GALLBLADDER:

Unremarkable. No gallstones.



COMMON BILE DUCT:

Measures 4.0 mm. No stones. No dilatation.



PANCREAS:

Unremarkable as visualized. No mass. No ductal dilatation.



RIGHT KIDNEY:

Measures 16.5cm.  There is diffuse increased echogenicity.  No 

calculus, mass, or hydronephrosis.  There are multiple variable size 

cysts. 



LEFT KIDNEY:

Measures 15.6cm.  There is diffuse increased echogenicity.  No 

calculus, mass, or hydronephrosis.  There are multiple variable size 

cysts. 



SPLEEN:

Normal in size and contour. No mass.



AORTA:

No aneurysmal dilatation. 



IVC:

Unremarkable. 



OTHER FINDINGS:

None. 



IMPRESSION:

1.  Two simple cysts in the liver, the larger in the left hepatic 

lobe measures 3.0 cm.



2.  Enlarged multicystic kidneys.

## 2018-09-19 NOTE — US
Date of service: 



09/19/2018



PROCEDURE:  Upper Extremity Venous Duplex Exam



HISTORY:

R/O acute DVT 



PRIORS:

None. 



TECHNIQUE:

Bilateral upper extremity, internal jugular, subclavian, axillary, 

brachial, ulnar, radial, basilic and upper cephalic veins were 

evaluated. Flow was assessed with color Doppler, compressibility, 

assessment of phasic flow and augmentation response.



Report prepared by   vascular technologist.



FINDINGS:



RIGHT:

1. Internal Jugular: 

1.1. Compressibility - Fully compressible: Thrombus -  None : Flow - 

Phasic: Augmentation -Normal: Reflux - None.





2. Subclavian:



2.1. Compressibility - Fully compressible: Thrombus -  None : Flow - 

Phasic: Augmentation -Normal: Reflux - None.



3. Axillary: 



3.1. Compressibility - Fully compressible: Thrombus - None :  Flow - 

Phasic: Augmentation -Normal: Reflux - None.



4. Brachial: 



4.1. Compressibility - Fully compressible: Thrombus -  None: Flow - 

Phasic: Augmentation -Normal: Reflux - None.



5. Ulnar:



5.1. Compressibility - Fully compressible: Thrombus -  None: Flow - 

Phasic: Augmentation -Normal: Reflux - None.



6. Radial:



6.1. Compressibility - Fully compressible: Thrombus - None: Flow - 

Phasic: Augmentation - Normal: Reflux - None.



7. Cephalic:



7.1. Compressibility - Fully compressible: Thrombus - None: Flow - 

Phasic: Augmentation -Normal: Reflux - None.



8. Basilic:

8.1.  Compressibility - Fully compressible: Thrombus - None: Flow - 

Phasic: Augmentation -Normal: Reflux - None.





LEFT:

1. Internal Jugular: 



1.1. Compressibility - Fully compressible: Thrombus - None : Flow - 

Phasic: Augmentation -Normal: Reflux - None.



2. Subclavian:



2.1. Compressibility - Fully compressible: Thrombus - None : Flow - 

Phasic: Augmentation -Normal: Reflux - None.



3. Axillary: 



3.1. Compressibility - Fully compressible: Thrombus - None : Flow - 

Phasic: Augmentation -Normal: Reflux - None.



4. Brachial: 

4.1. Compressibility - Fully compressible: Thrombus - None: Flow - 

Phasic: Augmentation -Normal: Reflux - None.





OTHER FINDINGS:  Right: None.



Left:  There is a AV dialysis fistula in the left antecubital fossa 

which appears patent.



IMPRESSION:

Right: 



No evidence of vein thrombosis of the right upper extremity with good 

venous flow. 



Left: 



No evidence of vein thrombosis of the left upper extremity with 

excellent venous flow. 



Patent left arm AV dialysis fistula.

## 2018-09-20 LAB
ALBUMIN SERPL-MCNC: 3.2 G/DL (ref 3.5–5)
ALBUMIN/GLOB SERPL: 0.9 {RATIO} (ref 1–2.1)
ALT SERPL-CCNC: 27 U/L (ref 9–52)
AST SERPL-CCNC: 21 U/L (ref 14–36)
BASOPHILS # BLD AUTO: 0 K/UL (ref 0–0.2)
BASOPHILS NFR BLD: 0.3 % (ref 0–2)
BUN SERPL-MCNC: 51 MG/DL (ref 7–17)
CALCIUM SERPL-MCNC: 8.1 MG/DL (ref 8.4–10.2)
EOSINOPHIL # BLD AUTO: 0.1 K/UL (ref 0–0.7)
EOSINOPHIL NFR BLD: 0.8 % (ref 0–4)
ERYTHROCYTE [DISTWIDTH] IN BLOOD BY AUTOMATED COUNT: 16.9 % (ref 11.5–14.5)
GFR NON-AFRICAN AMERICAN: 6
HGB BLD-MCNC: 9.8 G/DL (ref 12–16)
LYMPHOCYTES # BLD AUTO: 0.6 K/UL (ref 1–4.3)
LYMPHOCYTES NFR BLD AUTO: 4.7 % (ref 20–40)
MCH RBC QN AUTO: 27.4 PG (ref 27–31)
MCHC RBC AUTO-ENTMCNC: 32.3 G/DL (ref 33–37)
MCV RBC AUTO: 84.6 FL (ref 81–99)
MONOCYTES # BLD: 0.8 K/UL (ref 0–0.8)
MONOCYTES NFR BLD: 6.2 % (ref 0–10)
NEUTROPHILS # BLD: 11.5 K/UL (ref 1.8–7)
NEUTROPHILS NFR BLD AUTO: 88 % (ref 50–75)
NRBC BLD AUTO-RTO: 0 % (ref 0–0)
PLATELET # BLD: 164 K/UL (ref 130–400)
PMV BLD AUTO: 9.3 FL (ref 7.2–11.7)
RBC # BLD AUTO: 3.58 MIL/UL (ref 3.8–5.2)
WBC # BLD AUTO: 13.1 K/UL (ref 4.8–10.8)

## 2018-09-20 RX ADMIN — ERYTHROPOIETIN SCH UNIT: 4000 INJECTION, SOLUTION INTRAVENOUS; SUBCUTANEOUS at 09:30

## 2018-09-20 RX ADMIN — PANTOPRAZOLE SODIUM SCH MG: 40 TABLET, DELAYED RELEASE ORAL at 09:25

## 2018-09-20 NOTE — CT
Date of service: 



09/20/2018



PROCEDURE:  CT HEAD WITHOUT CONTRAST.



HISTORY:

Confusion/ Sepsis



COMPARISON:

05/17/2018.



TECHNIQUE:

Axial computed tomography images were obtained through the head/brain 

without intravenous contrast.  



Supplemental Coronal and Sagittal projectections created and reviewed.



Radiation dose:



Total exam DLP = 29.45 mGy-cm.



This CT exam was performed using one or more of the following dose 

reduction techniques: Automated exposure control, adjustment of the 

mA and/or kV according to patient size, and/or use of iterative 

reconstruction technique.



FINDINGS:



HEMORRHAGE:

No intracranial hemorrhage. 



BRAIN:

No mass effect or edema.  Extra-axial fluid collection, subdural 

hygroma right frontal parietal region unchanged compared to the prior 

study.



Stable encephalomalacia change adjacent to and more extensive than 

the left parietal craniotomy.



Unrelated atrophy, periventricular small vessel disease and small 

lacune or infarcts.



Stable cerebellar atrophy.  No brainstem abnormalities detected.



VENTRICLES:

No significant interval change compared to the prior examination(s). 



CALVARIUM:

Unremarkable.



PARANASAL SINUSES:

Unremarkable as visualized. No significant inflammatory changes.



MASTOID AIR CELLS:

Unremarkable as visualized. No inflammatory changes.



OTHER FINDINGS:

None.



IMPRESSION:

No acute intracranial abnormalities. No significant findings to 

account for the clinical presentation. No significant interval change 

compared to the prior examination(s).

## 2018-09-20 NOTE — CP.PCM.PN
Subjective





- Date & Time of Evaluation


Date of Evaluation: 09/20/18


Time of Evaluation: 09:56





- Subjective


Subjective: 





patient is awake and conscious not in acute distress. No nausea no vomiting.


Is still complaining of some abdominal pain for the most part on the right side





Objective





- Vital Signs/Intake and Output


Vital Signs (last 24 hours): 


 











Temp Pulse Resp BP Pulse Ox


 


 98.4 F   67   15   146/69   100 


 


 09/20/18 08:00  09/20/18 08:00  09/20/18 08:00  09/20/18 08:00  09/20/18 08:00








Intake and Output: 


 











 09/20/18 09/20/18





 06:59 18:59


 


Intake Total 200 0


 


Balance 200 0














- Medications


Medications: 


 Current Medications





Acetaminophen (Tylenol 325mg Tab)  650 mg PO Q6 PRN


   PRN Reason: Pain, Mild (1-3)


   Last Admin: 09/18/18 22:48 Dose:  650 mg


Albuterol/Ipratropium (Duoneb 3 Mg/0.5 Mg (3 Ml) Ud)  3 ml INH RQ6 PRN


   PRN Reason: Shortness of Breath


Dextrose (Dextrose 50% Inj)  0 ml IV STAT PRN; Protocol


   PRN Reason: Hypoglycemia Protocol


Dextrose (Glutose 15)  0 gm PO ONCE PRN; Protocol


   PRN Reason: Hypoglycemia Protocol


Epoetin Arvind (Procrit)  4,000 unit IV TTS [facility]


   Last Admin: 09/20/18 09:30 Dose:  4,000 unit


Glucagon (Glucagen Diagnostic Kit)  0 mg IM STAT PRN; Protocol


   PRN Reason: Hypoglycemia Protocol


Heparin Sodium (Porcine) (Heparin)  5,000 units SC Q8 [facility]


   PRN Reason: Protocol


   Last Admin: 09/20/18 09:25 Dose:  5,000 units


Azithromycin 500 mg/ Sodium (Chloride)  250 mls @ 250 mls/hr IVPB DAILY [facility]


   PRN Reason: Protocol


   Last Admin: 09/20/18 09:26 Dose:  250 mls/hr


Vancomycin HCl 1 gm/ Sodium (Chloride)  250 mls @ 125 mls/hr IVPB TTS [facility]


   PRN Reason: Protocol


   Last Admin: 09/20/18 09:26 Dose:  125 mls/hr


Piperacillin Sod/Tazobactam (Sod 2.25 gm/ Sodium Chloride)  100 mls @ 100 mls/

hr IVPB Q8@0500,1300,2100 [facility]


   PRN Reason: Protocol


   Last Admin: 09/20/18 04:30 Dose:  100 mls/hr


Insulin Human Regular (Humulin R)  0 units SC ACHS [facility]


   PRN Reason: Protocol


Ketorolac Tromethamine (Toradol)  30 mg IVP Q6 PRN


   PRN Reason: Pain, moderate (4-7)


   Last Admin: 09/20/18 03:57 Dose:  30 mg


Ondansetron HCl (Zofran Inj)  4 mg IVP Q6 PRN


   PRN Reason: Nausea/Vomiting


   Last Admin: 09/18/18 20:45 Dose:  4 mg


Pantoprazole Sodium (Protonix Ec Tab)  40 mg PO DAILY [facility]


   Last Admin: 09/20/18 09:25 Dose:  40 mg


Phenytoin Sodium (Dilantin)  100 mg PO TID [facility]


   Last Admin: 09/20/18 09:24 Dose:  100 mg











- Labs


Labs: 


 





 09/20/18 05:15 





 09/20/18 05:15 





 











PT  10.6 Seconds (9.8-13.1)   09/18/18  11:24    


 


INR  1.0   09/18/18  11:24    


 


APTT  29.6 Seconds (25.6-37.1)   09/18/18  11:24    














- Constitutional


Appears: No Acute Distress





- Eye Exam


Eye Exam: Conjunctival injection





- ENT Exam


ENT Exam: Mucous Membranes Moist





- Neck Exam


Neck Exam: absent: Lymphadenopathy





- Respiratory Exam


Respiratory Exam: NORMAL BREATHING PATTERN.  absent: Chest Wall Tenderness





- GI/Abdominal Exam


GI & Abdominal Exam: Guarding, Normal Bowel Sounds





- Extremities Exam


Extremities Exam: absent: Calf Tenderness





- Back Exam


Back Exam: absent: CVA tenderness (L), CVA tenderness (R)





- Neurological Exam


Neurological Exam: Alert





- Psychiatric Exam


Psychiatric exam: Normal Affect





- Skin


Skin Exam: absent: Cyanosis





Assessment and Plan


(1) Acute pulmonary edema


Status: Acute   





(2) Hyperkalemia


Status: Acute   





(3) Sepsis


Status: Acute   





(4) Chronic kidney disease with end stage renal failure on dialysis


Assessment & Plan: 


end stage renal disease on dialysis TTS


Patient admitted was volume overloaded and fever and leukocytosis


Abdominal pain has not been subsided completely suggestive of diverticulitis?


No vomiting reported


PMHx of HTN, Diabetes, Gastritis, Chronic Kidney Disease, Seizures and End 

Stage Renal Disease (Hemodialysis TTS)


the plan


Leukocytosis improving patient receiving Zosyn and vancomycin and as 

azithromycin


Hemodialysis about to start now with ultrafiltration 3000 mL to be removed


Suggest to do CT scan of the abdomen and pelvis


Glycemic control with diabetic management


Phosphorus binders


Status: Acute

## 2018-09-20 NOTE — CT
Date of service: 



09/20/2018



PROCEDURE:  CT Chest, Abdomen and Pelvis with intravenous contrast



HISTORY:

abd pain



COMPARISON:

September 19, 2018.  Abdominal ultrasound.



TECHNIQUE:

IV dose administered:  95 cc Omnipaque 300. 



Radiation dose:



Total exam DLP = 629.21 mGy-cm.



This CT exam was performed using one or more of the following dose 

reduction techniques: Automated exposure control, adjustment of the 

mA and/or kV according to patient size, and/or use of iterative 

reconstruction technique.



FINDINGS:



CT CHEST WITH CONTRAST:



LUNGS:

Trace dependent atelectasis both lower lobes.



MEDIASTINUM:

Unremarkable.  Dilated main pulmonary artery 3.7 cm consistent with 

pulmonary arterial hypertension.  Unremarkable/non aneurysmal 

thoracic aorta..  No aortic dissection. Cardiomegaly.  No evidence of 

acute, significant cardiovascular disease. 



LYMPH NODES:

Unremarkable.



PLEURA:

Trace bilateral pleural effusions.



BONES:

Unremarkable.



OTHER FINDINGS:

Bilateral cystic thyroid nodules of uncertain etiology/significance.



CT ABDOMEN AND PELVIS:



LIVER:

Hepatomegaly, innumerable hepatic cysts the preponderance of which 

are simple cysts.  Several cysts scattered throughout the liver 

contain areas calcification primarily rim calcifications. 



GALLBLADDER AND BILE DUCTS:

Unremarkable. 



PANCREAS:

Unremarkable. No gross lesion or ductal dilatation.



SPLEEN:

Unremarkable. 



ADRENALS:

Unremarkable. No mass. 



KIDNEYS AND URETERS:

Polycystic kidney disease.  Innumerable cysts identified bilaterally. 

 Several of these contain calcified rims.  No solid hepatic masses 

are visible. 



VASCULATURE:

Unremarkable. No aortic aneurysm. 



BOWEL:

Diverticulosis without an acute inflammatory component or other 

associated pathologic process..



Constipation without fecal impaction or obstruction.   



APPENDIX:

No abnormalities to suggest acute appendicitis. No right lower 

quadrant inflammatory processes identified. 



PERITONEUM:

Unremarkable. No free fluid. No free air. 



LYMPH NODES:

Unremarkable. No enlarged lymph nodes. 



BLADDER:

The bladder is decompressed. 



REPRODUCTIVE:

An atrophic uterus is visible. 



BONES:

No acute fracture. 



OTHER FINDINGS:

None.



IMPRESSION:

No acute findings related to/accounting  for the clinical 

presentation. Additional benign and/or incidental findings described 

above.

## 2018-09-20 NOTE — CP.CCUPN
CCU Subjective





- Physician Review


Subjective (Free Text): 


More appropriately responsive today.  No recurrent fevers since max of 102.6F 

on admission. 





Other vitals and I/O's reviewed. Bp 133/76, HR 70, RR 18,  SPO2 98% on nasal 

cannula.





ROS:  No other pertinent negs or positives on 10+  system review








PMSFH:   All other Nursing and physician documentation reviewed to date; no new 

pertinent info noted relevant to current medical problems.





EXAM- 


HEENT:  no icterus, no gaze preference, Pupils 3 mm and reactive


NECK: No JVD visible, supple, carotids equal upstroke bilat/no bruits


CHEST: decreased BS bases, no wheezes audible


HEART:  regular, distant S1S2, no rubs or murmurs audible. 


ABD:  softly distended, BS hypoactive, neg rebound or guarding, but grimaces 

with palpable tenderness over epigastric and periumbilical area. 


EXT:   + edema, no cyanosis; no calf tenderness or palpable cords, distal 

pulses intact and symmetrical. 


NEURO:   + tone all extremities, no gross focal deficits. 


SKIN:   no rashes,  warm and dry.








LABS:  


WBC= 13.1


HGB= 9.8   


PLTs= 164K


Lactate= 0.6





Na= 136


K= 4.6


CL= 93


HCO3=29  


BUN/Cr= 51/7.3 


BS= 129





IMPRESSION / MAJOR PROBLEMS NOW:  


1.    AMS 2 metabolic encephalopathy; r/o Sepsis Encephalopathy; occult 

Seizure activity,  CVA


2.    Fluid Overload state 2 missed HD with h/o ESRD


3.    Severe Sepsis without Shock state, r/o Intra-abdominal; Lung pathology


4.    h/o Seizure Disorder on Dilantin








PLAN:


1.   Improved cardiopulm status, check for recent ECHO for LV fx. 


2.   US Abd shows no pathology to account for patients abd pain. For CTAP, 

Chest today.  


3.   Blood Cx sterile to date.


4.   Empiric Abx as per ID.


5.   Stable for stepdown bed as per PMD.

## 2018-09-20 NOTE — PN
DATE:  09/20/2018



SUBJECTIVE:  The patient is seen today, 09/20/2018.  She was eating

breakfast during this examination.



PHYSICAL EXAMINATION:

VITAL SIGNS:  Blood pressure 136/68, temperature 98, respiratory rate 18,

and pulse 72.

HEENT:  Pupils equal, reactive to light.  Normal-appearing mucosa of the

conjunctivae, oropharyngeal and nasal membrane mucosa.

NECK:  Supple.  No JVD.  No carotid bruit.  No lymph node.  No thyromegaly.

CHEST AND LUNGS:  Bilateral symmetrical expansion.  Good air exchange.  No

rales, no rhonchi.

CARDIOVASCULAR SYSTEM:  PMI not localized.  S1 and S2.  No additional

sounds.

ABDOMEN:  Normoactive bowel sounds.  No tenderness.  No organomegaly.  No

masses.

EXTREMITIES:  No cyanosis, no clubbing, no edema.

CNS:  Alert, awake, oriented x2.  No neurological deficit could be

appreciated.



ASSESSMENT:

1.  Fever, leukocytosis, possible pneumonia versus intra-abdominal

infection.

2.  Status post hyperkalemia due to missing hemodialysis.

3.  End-stage renal disease, on hemodialysis.



PLAN:  Continue current IV antibiotics.  Discussed the patient's condition

with nephrologist who ordered CAT scan of the abdomen and pelvis to rule

out any intra-abdominal infection.  The patient is for hemodialysis today

also.







__________________________________________

Sundar Arndt MD



DD:  09/20/2018 13:17:39

DT:  09/20/2018 13:19:02

Job # [id number]

## 2018-09-21 RX ADMIN — PANTOPRAZOLE SODIUM SCH MG: 40 TABLET, DELAYED RELEASE ORAL at 09:11

## 2018-09-21 NOTE — PQF
PROVIDER RESPONSE TEXT:



It is volume overload not CHF



REVIEWER QUERY TEXT:



Heart Failure Acuity and Type



AFTER WORKUP PLEASE CLARIFY THE TYPE AND ACUITY  OF CHF IF KNOWN.



Echo from 5/17/18:  EF 60-65% , Moderate MR, Transmittal Doppler flow pattern is Grade I- abnormal re
laxation pattern. See full report



Given IV Lasix in the ER. Pro BNP 33,000 . Patient with ESRD



Congestive Heart Failure is documented in the Medical Record. Please document the type and acuity (in
cludes probable or suspected)

Such as:

Type:

-- Combined systolic and diastolic (heart failure with reduced ejection fraction and diastolic) dysfu
nction

-- Diastolic  (HFpEF)

-- Systolic (HFrEF)

-- Left heart failure

-- Right heart failure

-- Right heart failure due to left heart failure

-- High output failure

-- End stage heart failure

-- Other, please specify



Acuity:

-- Acute

-- Chronic

-- Acute on chronic

-- Other, please specify



Also please document the underlying cause of the CHF (includes probable or suspected)





The patient's Clinical Indicators include:

C/O SOB. + febrile, elevated WBC,  Pro BNP 33,000 . Patient with ESRD on hemodialysis



CXR: Mild cardiomegaly. Suspect bibasilar atelectasis.



Echo from 5/17/18:  EF 60-65% , Moderate MR, Transmittal Doppler flow pattern is Grade I- abnormal re
laxation pattern. See full report



Given IV Lasix in the ER.







Query created by: Puja Jacob on 9/21/2018 8:59 AM





Electronically signed by:  Sundar Arndt MD 9/21/2018 11:11 PM

## 2018-09-21 NOTE — CP.PCM.PN
Subjective





- Date & Time of Evaluation


Date of Evaluation: 09/21/18


Time of Evaluation: 15:27





- Subjective


Subjective: 





sitting up


feeling better


no abdominal pain





Objective





- Vital Signs/Intake and Output


Vital Signs (last 24 hours): 


 











Temp Pulse Resp BP Pulse Ox


 


 98.5 F   76   20   148/71   96 


 


 09/21/18 12:41  09/21/18 12:41  09/21/18 12:41  09/21/18 12:41  09/21/18 12:41











- Medications


Medications: 


 Current Medications





Acetaminophen (Tylenol 325mg Tab)  650 mg PO Q6 PRN


   PRN Reason: Pain, Mild (1-3)


   Last Admin: 09/18/18 22:48 Dose:  650 mg


Albuterol/Ipratropium (Duoneb 3 Mg/0.5 Mg (3 Ml) Ud)  3 ml INH RQ6 PRN


   PRN Reason: Shortness of Breath


Dextrose (Dextrose 50% Inj)  0 ml IV STAT PRN; Protocol


   PRN Reason: Hypoglycemia Protocol


Dextrose (Glutose 15)  0 gm PO ONCE PRN; Protocol


   PRN Reason: Hypoglycemia Protocol


Epoetin Arvind (Procrit)  4,000 unit IV TTS [facility]


   Last Admin: 09/20/18 09:30 Dose:  4,000 unit


Glucagon (Glucagen Diagnostic Kit)  0 mg IM STAT PRN; Protocol


   PRN Reason: Hypoglycemia Protocol


Heparin Sodium (Porcine) (Heparin)  5,000 units SC Q8 [facility]


   PRN Reason: Protocol


   Last Admin: 09/21/18 09:11 Dose:  5,000 units


Azithromycin 500 mg/ Sodium (Chloride)  250 mls @ 250 mls/hr IVPB DAILY [facility]


   PRN Reason: Protocol


   Last Admin: 09/21/18 09:07 Dose:  250 mls/hr


Vancomycin HCl 1 gm/ Sodium (Chloride)  250 mls @ 125 mls/hr IVPB TTS [facility]


   PRN Reason: Protocol


   Last Admin: 09/20/18 09:26 Dose:  125 mls/hr


Piperacillin Sod/Tazobactam (Sod 2.25 gm/ Sodium Chloride)  100 mls @ 100 mls/

hr IVPB Q8@0500,1300,2100 [facility]


   PRN Reason: Protocol


   Last Admin: 09/21/18 04:46 Dose:  100 mls/hr


Insulin Human Regular (Humulin R)  0 units SC ACHS [facility]


   PRN Reason: Protocol


   Last Admin: 09/21/18 06:34 Dose:  Not Given


Ketorolac Tromethamine (Toradol)  30 mg IVP Q6 PRN


   PRN Reason: Pain, moderate (4-7)


   Last Admin: 09/21/18 12:53 Dose:  30 mg


Ondansetron HCl (Zofran Inj)  4 mg IVP Q6 PRN


   PRN Reason: Nausea/Vomiting


   Last Admin: 09/18/18 20:45 Dose:  4 mg


Pantoprazole Sodium (Protonix Ec Tab)  40 mg PO DAILY [facility]


   Last Admin: 09/21/18 09:11 Dose:  40 mg


Phenytoin Sodium (Dilantin)  100 mg PO TID [facility]


   Last Admin: 09/21/18 12:53 Dose:  100 mg











- Labs


Labs: 


 





 09/20/18 05:15 





 09/20/18 05:15 





 











PT  10.6 Seconds (9.8-13.1)   09/18/18  11:24    


 


INR  1.0   09/18/18  11:24    


 


APTT  29.6 Seconds (25.6-37.1)   09/18/18  11:24    














- Constitutional


Appears: No Acute Distress





- Eye Exam


Eye Exam: Conjunctival injection





- ENT Exam


ENT Exam: Mucous Membranes Moist





- Neck Exam


Neck Exam: absent: Lymphadenopathy





- Respiratory Exam


Respiratory Exam: absent: Chest Wall Tenderness





- Cardiovascular Exam


Cardiovascular Exam: absent: Gallop, JVD, Rubs





- GI/Abdominal Exam


GI & Abdominal Exam: Soft, Normal Bowel Sounds





- Extremities Exam


Extremities Exam: absent: Calf Tenderness





- Back Exam


Back Exam: absent: CVA tenderness (L), CVA tenderness (R)





- Neurological Exam


Neurological Exam: Alert





- Psychiatric Exam


Psychiatric exam: Normal Affect





- Skin


Skin Exam: absent: Cyanosis





Assessment and Plan


(1) Acute pulmonary edema


Status: Acute   





(2) Hyperkalemia


Status: Acute   





(3) Sepsis


Status: Acute   





(4) Chronic kidney disease with end stage renal failure on dialysis


Assessment & Plan: 





end stage renal disease on dialysis TTS


Patient admitted was volume overloaded and fever and leukocytosis


Abdominal pain has not been subsided completely suggestive of diverticulitis?


No vomiting reported


PMHx of HTN, Diabetes, Gastritis, Chronic Kidney Disease, Seizures and End 

Stage Renal Disease (Hemodialysis TTS)


the plan


Leukocytosis improving patient receiving Zosyn and vancomycin and as 

azithromycin


Hemodialysis TTS


 CT scan of the abdomen and pelvis none specific


Glycemic control with diabetic management


Phosphorus binders


doing better








Status: Acute

## 2018-09-21 NOTE — PN
DATE:  09/19/2018



SUBJECTIVE:  The patient was seen on 09/19/2018.  She was not in any

cardiopulmonary distress, but the patient was complaining of back pain and

lower abdominal pain.



PHYSICAL EXAMINATION:

VITAL SIGNS:  Blood pressure was 120/65, temperature 97.9, respiratory rate

19, and pulse 90.

HEENT:  Pupils equal, reactive to light.  Normal-appearing mucosa of the

conjunctivae, oropharynx, and nasal membrane mucosa.

NECK:  Supple.  No JVD.  No carotid bruit.  No lymph node.  No thyromegaly.

CHEST AND LUNGS:  Bilateral symmetrical expansion.  Good air exchange.  No

rales, no rhonchi.

CARDIOVASCULAR SYSTEM:  PMI not localized.  S1 and S2.  No additional

sounds.

ABDOMEN:  Normoactive bowel sounds.  No tenderness.  No organomegaly.  No

masses.

EXTREMITIES:  No cyanosis, no clubbing, no edema.

CNS:  Alert, awake, oriented x2.  No neurological deficit could be

appreciated.



ASSESSMENT:

1.  Hyperkalemia due to missing hemodialysis.

2.  End-stage renal disease, on hemodialysis.

3.  Hypertension.

4.  Type 2 diabetes mellitus.

5.  Pneumonia versus congestive heart failure with leukocytosis.

6.  Lower abdominal pain, likely to be referred pain from the degenerative

spine disease.



PLAN:  Continue current antibiotics as per ID.  Discussed the patient's

condition with nephrologist.  The patient is for hemodialysis again

tomorrow.  Her white blood cell count came down from 21.8 to 18.3.  We will

monitor.  Potassium came down from 8 to 4.5.







__________________________________________

Sundar Arndt MD



DD:  09/20/2018 13:15:08

DT:  09/20/2018 13:18:16

Job # [id number]

## 2018-09-21 NOTE — CP.PCM.PN
Subjective





- Date & Time of Evaluation


Date of Evaluation: 09/21/18


Time of Evaluation: 09:14





- Subjective


Subjective: 





ID note-





pt. seen and examined today in tele unit.





Pt. awake and denies any cough or any sob.


denies any fever.


states feels much better.





Objective





- Vital Signs/Intake and Output


Vital Signs (last 24 hours): 


 











Temp Pulse Resp BP Pulse Ox


 


 98.5 F   72   20   157/65 H  100 


 


 09/21/18 08:21  09/21/18 08:21  09/21/18 08:21  09/21/18 08:21  09/21/18 08:21











- Medications


Medications: 


 Current Medications





Acetaminophen (Tylenol 325mg Tab)  650 mg PO Q6 PRN


   PRN Reason: Pain, Mild (1-3)


   Last Admin: 09/18/18 22:48 Dose:  650 mg


Albuterol/Ipratropium (Duoneb 3 Mg/0.5 Mg (3 Ml) Ud)  3 ml INH RQ6 PRN


   PRN Reason: Shortness of Breath


Dextrose (Dextrose 50% Inj)  0 ml IV STAT PRN; Protocol


   PRN Reason: Hypoglycemia Protocol


Dextrose (Glutose 15)  0 gm PO ONCE PRN; Protocol


   PRN Reason: Hypoglycemia Protocol


Epoetin Arvind (Procrit)  4,000 unit IV TTS [facility]


   Last Admin: 09/20/18 09:30 Dose:  4,000 unit


Glucagon (Glucagen Diagnostic Kit)  0 mg IM STAT PRN; Protocol


   PRN Reason: Hypoglycemia Protocol


Heparin Sodium (Porcine) (Heparin)  5,000 units SC Q8 [facility]


   PRN Reason: Protocol


   Last Admin: 09/21/18 09:11 Dose:  5,000 units


Azithromycin 500 mg/ Sodium (Chloride)  250 mls @ 250 mls/hr IVPB DAILY [facility]


   PRN Reason: Protocol


   Last Admin: 09/21/18 09:07 Dose:  250 mls/hr


Vancomycin HCl 1 gm/ Sodium (Chloride)  250 mls @ 125 mls/hr IVPB TTS [facility]


   PRN Reason: Protocol


   Last Admin: 09/20/18 09:26 Dose:  125 mls/hr


Piperacillin Sod/Tazobactam (Sod 2.25 gm/ Sodium Chloride)  100 mls @ 100 mls/

hr IVPB Q8@0500,1300,2100 [facility]


   PRN Reason: Protocol


   Last Admin: 09/21/18 04:46 Dose:  100 mls/hr


Insulin Human Regular (Humulin R)  0 units SC ACHS [facility]


   PRN Reason: Protocol


   Last Admin: 09/21/18 06:34 Dose:  Not Given


Ketorolac Tromethamine (Toradol)  30 mg IVP Q6 PRN


   PRN Reason: Pain, moderate (4-7)


   Last Admin: 09/20/18 20:08 Dose:  30 mg


Ondansetron HCl (Zofran Inj)  4 mg IVP Q6 PRN


   PRN Reason: Nausea/Vomiting


   Last Admin: 09/18/18 20:45 Dose:  4 mg


Pantoprazole Sodium (Protonix Ec Tab)  40 mg PO DAILY [facility]


   Last Admin: 09/21/18 09:11 Dose:  40 mg


Phenytoin Sodium (Dilantin)  100 mg PO TID [facility]


   Last Admin: 09/21/18 09:11 Dose:  100 mg











- Labs


Labs: 


 





 





- Additional Findings


Additional findings: 








- Constitutional


Appears: No Acute Distress





- Head Exam


Head Exam: ATRAUMATIC





- Eye Exam


Eye Exam: EOMI, PERRL





- ENT Exam


ENT Exam: Normal Oropharynx





- Neck Exam


Neck exam: Positive for: Full Rom





- Respiratory Exam


Additional comments: 





decreased bibasilar crackles


no wheezing








- Cardiovascular Exam


Cardiovascular Exam: RRR, +S1, +S2





- GI/Abdominal Exam


GI & Abdominal Exam: Normal Bowel Sounds, Soft


Additional comments: 





NT, ND


no guarding, no rebound


no CVA tenderness





- Extremities Exam


Extremities exam: Positive for: normal inspection





- Neurological Exam


Additional comments: 





AAO x 3





 Laboratory Results - last 72 hr











  09/18/18 09/18/18 09/18/18





  18:00 18:00 20:54


 


WBC   


 


RBC   


 


Hgb   


 


Hct   


 


MCV   


 


MCH   


 


MCHC   


 


RDW   


 


Plt Count   


 


MPV   


 


Neut % (Auto)   


 


Lymph % (Auto)   


 


Mono % (Auto)   


 


Eos % (Auto)   


 


Baso % (Auto)   


 


Neut # (Auto)   


 


Lymph # (Auto)   


 


Mono # (Auto)   


 


Eos # (Auto)   


 


Baso # (Auto)   


 


Neutrophils % (Manual)   


 


Band Neutrophils %   


 


Lymphocytes % (Manual)   


 


Monocytes % (Manual)   


 


Platelet Estimate   


 


Hypochromasia (manual)   


 


Poikilocytosis (manual   


 


Anisocytosis (manual)   


 


Microcytosis (manual)   


 


Ovalocytes   


 


Sodium   


 


Potassium   


 


Chloride   


 


Carbon Dioxide   


 


Anion Gap   


 


BUN   


 


Creatinine   


 


Est GFR ( Amer)   


 


Est GFR (Non-Af Amer)   


 


POC Glucose (mg/dL)    124 H


 


Random Glucose   


 


Lactic Acid   


 


Calcium   


 


Phosphorus   


 


Magnesium   


 


Total Bilirubin   


 


AST   


 


ALT   


 


Alkaline Phosphatase   


 


Total Protein   


 


Albumin   


 


Globulin   


 


Albumin/Globulin Ratio   


 


Procalcitonin   


 


PTH Intact Whole Molec   


 


Urine Color   


 


Urine Clarity   


 


Urine pH   


 


Ur Specific Gravity   


 


Urine Protein   


 


Urine Glucose (UA)   


 


Urine Ketones   


 


Urine Blood   


 


Urine Nitrate   


 


Urine Bilirubin   


 


Urine Urobilinogen   


 


Ur Leukocyte Esterase   


 


Urine RBC (Auto)   


 


Urine Microscopic WBC   


 


Urine Bacteria   


 


Hep Bs Antigen  Negative  


 


Hep Bs Antibody   Negative 


 


Hep B Core IgM Ab  Negative  














  09/18/18 09/18/18 09/18/18





  22:04 22:04 22:04


 


WBC  21.8 H D  


 


RBC  4.20  


 


Hgb  11.3 L  


 


Hct  35.7  


 


MCV  84.9  


 


MCH  26.9 L  


 


MCHC  31.7 L  


 


RDW  17.5 H  


 


Plt Count  196  


 


MPV  9.1  


 


Neut % (Auto)  90.2 H  


 


Lymph % (Auto)  4.2 L  


 


Mono % (Auto)  5.4  


 


Eos % (Auto)  0.0  


 


Baso % (Auto)  0.2  


 


Neut # (Auto)  19.7 H  


 


Lymph # (Auto)  0.9 L  


 


Mono # (Auto)  1.2 H  


 


Eos # (Auto)  0.0  


 


Baso # (Auto)  0.0  


 


Neutrophils % (Manual)  78 H  


 


Band Neutrophils %  6 H  


 


Lymphocytes % (Manual)  11 L  


 


Monocytes % (Manual)  5  


 


Platelet Estimate  Normal  


 


Hypochromasia (manual)  Slight  


 


Poikilocytosis (manual  Slight  


 


Anisocytosis (manual)  Slight  


 


Microcytosis (manual)  Slight  


 


Ovalocytes  Slight  


 


Sodium   138 


 


Potassium   4.3 


 


Chloride   92 L 


 


Carbon Dioxide   31 H 


 


Anion Gap   19 


 


BUN   25 H 


 


Creatinine   4.7 H 


 


Est GFR ( Amer)   11 


 


Est GFR (Non-Af Amer)   9 


 


POC Glucose (mg/dL)   


 


Random Glucose   127 H 


 


Lactic Acid    2.4 H


 


Calcium   9.3 


 


Phosphorus   5.2 H 


 


Magnesium   2.0 


 


Total Bilirubin   


 


AST   


 


ALT   


 


Alkaline Phosphatase   


 


Total Protein   


 


Albumin   


 


Globulin   


 


Albumin/Globulin Ratio   


 


Procalcitonin   


 


PTH Intact Whole Molec   


 


Urine Color   


 


Urine Clarity   


 


Urine pH   


 


Ur Specific Gravity   


 


Urine Protein   


 


Urine Glucose (UA)   


 


Urine Ketones   


 


Urine Blood   


 


Urine Nitrate   


 


Urine Bilirubin   


 


Urine Urobilinogen   


 


Ur Leukocyte Esterase   


 


Urine RBC (Auto)   


 


Urine Microscopic WBC   


 


Urine Bacteria   


 


Hep Bs Antigen   


 


Hep Bs Antibody   


 


Hep B Core IgM Ab   














  09/19/18 09/19/18 09/19/18





  04:30 04:30 06:13


 


WBC  18.3 H  


 


RBC  3.81  


 


Hgb  10.2 L  


 


Hct  32.2 L  


 


MCV  84.6  


 


MCH  26.9 L  


 


MCHC  31.8 L  


 


RDW  17.5 H  


 


Plt Count  172  


 


MPV  9.4  


 


Neut % (Auto)  87.5 H  


 


Lymph % (Auto)  6.5 L  


 


Mono % (Auto)  5.7  


 


Eos % (Auto)  0.0  


 


Baso % (Auto)  0.3  


 


Neut # (Auto)  16.0 H  


 


Lymph # (Auto)  1.2  


 


Mono # (Auto)  1.0 H  


 


Eos # (Auto)  0.0  


 


Baso # (Auto)  0.0  


 


Neutrophils % (Manual)   


 


Band Neutrophils %   


 


Lymphocytes % (Manual)   


 


Monocytes % (Manual)   


 


Platelet Estimate   


 


Hypochromasia (manual)   


 


Poikilocytosis (manual   


 


Anisocytosis (manual)   


 


Microcytosis (manual)   


 


Ovalocytes   


 


Sodium   136 


 


Potassium   4.5 


 


Chloride   93 L 


 


Carbon Dioxide   32 H 


 


Anion Gap   16 


 


BUN   31 H 


 


Creatinine   5.3 H 


 


Est GFR ( Amer)   10 


 


Est GFR (Non-Af Amer)   8 


 


POC Glucose (mg/dL)    142 H


 


Random Glucose   146 H 


 


Lactic Acid   


 


Calcium   8.7 


 


Phosphorus   


 


Magnesium   


 


Total Bilirubin   0.7 


 


AST   33 


 


ALT   27 


 


Alkaline Phosphatase   128 H 


 


Total Protein   7.1 


 


Albumin   3.4 L 


 


Globulin   3.6 


 


Albumin/Globulin Ratio   1.0 


 


Procalcitonin   


 


PTH Intact Whole Molec   


 


Urine Color   


 


Urine Clarity   


 


Urine pH   


 


Ur Specific Gravity   


 


Urine Protein   


 


Urine Glucose (UA)   


 


Urine Ketones   


 


Urine Blood   


 


Urine Nitrate   


 


Urine Bilirubin   


 


Urine Urobilinogen   


 


Ur Leukocyte Esterase   


 


Urine RBC (Auto)   


 


Urine Microscopic WBC   


 


Urine Bacteria   


 


Hep Bs Antigen   


 


Hep Bs Antibody   


 


Hep B Core IgM Ab   














  09/19/18 09/19/18 09/19/18





  09:39 10:00 10:00


 


WBC   


 


RBC   


 


Hgb   


 


Hct   


 


MCV   


 


MCH   


 


MCHC   


 


RDW   


 


Plt Count   


 


MPV   


 


Neut % (Auto)   


 


Lymph % (Auto)   


 


Mono % (Auto)   


 


Eos % (Auto)   


 


Baso % (Auto)   


 


Neut # (Auto)   


 


Lymph # (Auto)   


 


Mono # (Auto)   


 


Eos # (Auto)   


 


Baso # (Auto)   


 


Neutrophils % (Manual)   


 


Band Neutrophils %   


 


Lymphocytes % (Manual)   


 


Monocytes % (Manual)   


 


Platelet Estimate   


 


Hypochromasia (manual)   


 


Poikilocytosis (manual   


 


Anisocytosis (manual)   


 


Microcytosis (manual)   


 


Ovalocytes   


 


Sodium   


 


Potassium   


 


Chloride   


 


Carbon Dioxide   


 


Anion Gap   


 


BUN   


 


Creatinine   


 


Est GFR ( Amer)   


 


Est GFR (Non-Af Amer)   


 


POC Glucose (mg/dL)   


 


Random Glucose   


 


Lactic Acid   


 


Calcium   


 


Phosphorus   7.0 H 


 


Magnesium   


 


Total Bilirubin   


 


AST   


 


ALT   


 


Alkaline Phosphatase   


 


Total Protein   


 


Albumin   


 


Globulin   


 


Albumin/Globulin Ratio   


 


Procalcitonin   


 


PTH Intact Whole Molec    240 H


 


Urine Color  Yellow  


 


Urine Clarity  Slighty-cloudy  


 


Urine pH  8.0  


 


Ur Specific Gravity  1.006  


 


Urine Protein  100  


 


Urine Glucose (UA)  Neg  


 


Urine Ketones  Negative  


 


Urine Blood  Trace  


 


Urine Nitrate  Negative  


 


Urine Bilirubin  Negative  


 


Urine Urobilinogen  0.2-1.0  


 


Ur Leukocyte Esterase  Neg  


 


Urine RBC (Auto)  4 H  


 


Urine Microscopic WBC  1  


 


Urine Bacteria  Rare  


 


Hep Bs Antigen   


 


Hep Bs Antibody   


 


Hep B Core IgM Ab   














  09/19/18 09/19/18 09/20/18





  11:15 21:32 05:15


 


WBC    13.1 H


 


RBC    3.58 L


 


Hgb    9.8 L


 


Hct    30.3 L


 


MCV    84.6


 


MCH    27.4


 


MCHC    32.3 L


 


RDW    16.9 H


 


Plt Count    164


 


MPV    9.3


 


Neut % (Auto)    88.0 H


 


Lymph % (Auto)    4.7 L


 


Mono % (Auto)    6.2


 


Eos % (Auto)    0.8


 


Baso % (Auto)    0.3


 


Neut # (Auto)    11.5 H


 


Lymph # (Auto)    0.6 L


 


Mono # (Auto)    0.8


 


Eos # (Auto)    0.1


 


Baso # (Auto)    0.0


 


Neutrophils % (Manual)   


 


Band Neutrophils %   


 


Lymphocytes % (Manual)   


 


Monocytes % (Manual)   


 


Platelet Estimate   


 


Hypochromasia (manual)   


 


Poikilocytosis (manual   


 


Anisocytosis (manual)   


 


Microcytosis (manual)   


 


Ovalocytes   


 


Sodium   


 


Potassium   


 


Chloride   


 


Carbon Dioxide   


 


Anion Gap   


 


BUN   


 


Creatinine   


 


Est GFR ( Amer)   


 


Est GFR (Non-Af Amer)   


 


POC Glucose (mg/dL)  161 H  168 H 


 


Random Glucose   


 


Lactic Acid   


 


Calcium   


 


Phosphorus   


 


Magnesium   


 


Total Bilirubin   


 


AST   


 


ALT   


 


Alkaline Phosphatase   


 


Total Protein   


 


Albumin   


 


Globulin   


 


Albumin/Globulin Ratio   


 


Procalcitonin   


 


PTH Intact Whole Molec   


 


Urine Color   


 


Urine Clarity   


 


Urine pH   


 


Ur Specific Gravity   


 


Urine Protein   


 


Urine Glucose (UA)   


 


Urine Ketones   


 


Urine Blood   


 


Urine Nitrate   


 


Urine Bilirubin   


 


Urine Urobilinogen   


 


Ur Leukocyte Esterase   


 


Urine RBC (Auto)   


 


Urine Microscopic WBC   


 


Urine Bacteria   


 


Hep Bs Antigen   


 


Hep Bs Antibody   


 


Hep B Core IgM Ab   














  09/20/18 09/20/18 09/20/18





  05:15 05:15 05:15


 


WBC   


 


RBC   


 


Hgb   


 


Hct   


 


MCV   


 


MCH   


 


MCHC   


 


RDW   


 


Plt Count   


 


MPV   


 


Neut % (Auto)   


 


Lymph % (Auto)   


 


Mono % (Auto)   


 


Eos % (Auto)   


 


Baso % (Auto)   


 


Neut # (Auto)   


 


Lymph # (Auto)   


 


Mono # (Auto)   


 


Eos # (Auto)   


 


Baso # (Auto)   


 


Neutrophils % (Manual)   


 


Band Neutrophils %   


 


Lymphocytes % (Manual)   


 


Monocytes % (Manual)   


 


Platelet Estimate   


 


Hypochromasia (manual)   


 


Poikilocytosis (manual   


 


Anisocytosis (manual)   


 


Microcytosis (manual)   


 


Ovalocytes   


 


Sodium  136  


 


Potassium  4.6  


 


Chloride  93 L  


 


Carbon Dioxide  29  


 


Anion Gap  19  


 


BUN  51 H  


 


Creatinine  7.3 H  


 


Est GFR ( Amer)  7  


 


Est GFR (Non-Af Amer)  6  


 


POC Glucose (mg/dL)   


 


Random Glucose  129 H  


 


Lactic Acid   0.6 L 


 


Calcium  8.1 L  


 


Phosphorus   


 


Magnesium   


 


Total Bilirubin  0.6  


 


AST  21  


 


ALT  27  


 


Alkaline Phosphatase  108  


 


Total Protein  6.7  


 


Albumin  3.2 L  


 


Globulin  3.5  


 


Albumin/Globulin Ratio  0.9 L  


 


Procalcitonin    54.65 H


 


PTH Intact Whole Molec   


 


Urine Color   


 


Urine Clarity   


 


Urine pH   


 


Ur Specific Gravity   


 


Urine Protein   


 


Urine Glucose (UA)   


 


Urine Ketones   


 


Urine Blood   


 


Urine Nitrate   


 


Urine Bilirubin   


 


Urine Urobilinogen   


 


Ur Leukocyte Esterase   


 


Urine RBC (Auto)   


 


Urine Microscopic WBC   


 


Urine Bacteria   


 


Hep Bs Antigen   


 


Hep Bs Antibody   


 


Hep B Core IgM Ab   














  09/20/18 09/20/18 09/20/18





  06:05 11:39 16:51


 


WBC   


 


RBC   


 


Hgb   


 


Hct   


 


MCV   


 


MCH   


 


MCHC   


 


RDW   


 


Plt Count   


 


MPV   


 


Neut % (Auto)   


 


Lymph % (Auto)   


 


Mono % (Auto)   


 


Eos % (Auto)   


 


Baso % (Auto)   


 


Neut # (Auto)   


 


Lymph # (Auto)   


 


Mono # (Auto)   


 


Eos # (Auto)   


 


Baso # (Auto)   


 


Neutrophils % (Manual)   


 


Band Neutrophils %   


 


Lymphocytes % (Manual)   


 


Monocytes % (Manual)   


 


Platelet Estimate   


 


Hypochromasia (manual)   


 


Poikilocytosis (manual   


 


Anisocytosis (manual)   


 


Microcytosis (manual)   


 


Ovalocytes   


 


Sodium   


 


Potassium   


 


Chloride   


 


Carbon Dioxide   


 


Anion Gap   


 


BUN   


 


Creatinine   


 


Est GFR ( Amer)   


 


Est GFR (Non-Af Amer)   


 


POC Glucose (mg/dL)  137 H  226 H  59 L


 


Random Glucose   


 


Lactic Acid   


 


Calcium   


 


Phosphorus   


 


Magnesium   


 


Total Bilirubin   


 


AST   


 


ALT   


 


Alkaline Phosphatase   


 


Total Protein   


 


Albumin   


 


Globulin   


 


Albumin/Globulin Ratio   


 


Procalcitonin   


 


PTH Intact Whole Molec   


 


Urine Color   


 


Urine Clarity   


 


Urine pH   


 


Ur Specific Gravity   


 


Urine Protein   


 


Urine Glucose (UA)   


 


Urine Ketones   


 


Urine Blood   


 


Urine Nitrate   


 


Urine Bilirubin   


 


Urine Urobilinogen   


 


Ur Leukocyte Esterase   


 


Urine RBC (Auto)   


 


Urine Microscopic WBC   


 


Urine Bacteria   


 


Hep Bs Antigen   


 


Hep Bs Antibody   


 


Hep B Core IgM Ab   














  09/20/18 09/21/18 09/21/18





  21:14 05:20 11:05


 


WBC   


 


RBC   


 


Hgb   


 


Hct   


 


MCV   


 


MCH   


 


MCHC   


 


RDW   


 


Plt Count   


 


MPV   


 


Neut % (Auto)   


 


Lymph % (Auto)   


 


Mono % (Auto)   


 


Eos % (Auto)   


 


Baso % (Auto)   


 


Neut # (Auto)   


 


Lymph # (Auto)   


 


Mono # (Auto)   


 


Eos # (Auto)   


 


Baso # (Auto)   


 


Neutrophils % (Manual)   


 


Band Neutrophils %   


 


Lymphocytes % (Manual)   


 


Monocytes % (Manual)   


 


Platelet Estimate   


 


Hypochromasia (manual)   


 


Poikilocytosis (manual   


 


Anisocytosis (manual)   


 


Microcytosis (manual)   


 


Ovalocytes   


 


Sodium   


 


Potassium   


 


Chloride   


 


Carbon Dioxide   


 


Anion Gap   


 


BUN   


 


Creatinine   


 


Est GFR ( Amer)   


 


Est GFR (Non-Af Amer)   


 


POC Glucose (mg/dL)  142 H  131 H  204 H


 


Random Glucose   


 


Lactic Acid   


 


Calcium   


 


Phosphorus   


 


Magnesium   


 


Total Bilirubin   


 


AST   


 


ALT   


 


Alkaline Phosphatase   


 


Total Protein   


 


Albumin   


 


Globulin   


 


Albumin/Globulin Ratio   


 


Procalcitonin   


 


PTH Intact Whole Molec   


 


Urine Color   


 


Urine Clarity   


 


Urine pH   


 


Ur Specific Gravity   


 


Urine Protein   


 


Urine Glucose (UA)   


 


Urine Ketones   


 


Urine Blood   


 


Urine Nitrate   


 


Urine Bilirubin   


 


Urine Urobilinogen   


 


Ur Leukocyte Esterase   


 


Urine RBC (Auto)   


 


Urine Microscopic WBC   


 


Urine Bacteria   


 


Hep Bs Antigen   


 


Hep Bs Antibody   


 


Hep B Core IgM Ab   








 Microbiology





09/18/18 11:38   Blood   Blood Culture - Preliminary


                            NO GROWTH AFTER 3 DAYS


09/18/18 22:04   Blood-Venous   Blood Culture - Preliminary


                            NO GROWTH AFTER 48 HOURS


09/18/18 22:04   Blood-Venous   Blood Culture - Preliminary


                            NO GROWTH AFTER 48 HOURS


09/19/18 19:00   Blood-Venous   Blood Culture - Preliminary


                            NO GROWTH AFTER 24 HOURS


09/19/18 18:30   Blood-Venous   Blood Culture - Preliminary


                            NO GROWTH AFTER 24 HOURS


09/19/18 09:39   Urine,Catheterized   Urine Culture - Final


                            No Growth (<1,000 CFU/ML)


09/18/18 13:40   Blood   Blood Culture - Preliminary


                            NO GROWTH AFTER 48 HOURS


09/18/18 17:00   Nose   MRSA Culture (Admit) - Final


                            MRSA NOT DETECTED





 





 














Assessment and Plan


(1) Chronic kidney disease with end stage renal failure on dialysis


Status: Acute   





(2) Acute-on-chronic renal failure


Status: Acute   





(3) Hyperkalemia


Status: Acute   





(4) Fever


Status: Acute   





- Assessment and Plan (Free Text)


Assessment: 





A/P-


72 year old female with PMH of DM II, ESRD on HD, admitted with sob and resp 

distress and fever.





clinically much better.


has remained afebrile.


leukocytosis trending down.


blood cx- negative x 6


UA- negative


urine cx- neg


high Pro-BNP





pneumonia vs CHF exacerbation


leukocytosis


fever


ESRD on HD


chect CT report noted ( no lung findings other than mild atelectasis as per 

report).








Plan-


continue with zosyn and Vanco empirically  for healthcare associated pneumonia. 

day #4


keep vanco trough between 10-15.


continue IV abx for 1 more day.


continue with zithromax as well, can be switched to oral zithromax.


d/c zithromax tomm.


HD as per renal team

## 2018-09-22 RX ADMIN — ERYTHROPOIETIN SCH UNIT: 4000 INJECTION, SOLUTION INTRAVENOUS; SUBCUTANEOUS at 18:32

## 2018-09-22 RX ADMIN — PANTOPRAZOLE SODIUM SCH MG: 40 TABLET, DELAYED RELEASE ORAL at 09:09

## 2018-09-22 NOTE — CP.PCM.PN
Subjective





- Date & Time of Evaluation


Date of Evaluation: 09/22/18


Time of Evaluation: 22:06





- Subjective


Subjective: 





renal follow up note





no complaints today 





vitals reviewed


heent normal 


o pmoist


no jvd


s1s2 present


no resp distress


abd soft 


no edema 


ao times 3


skin onormal


cooperative 





plan 


end stage renal disease on dialysis TTS


HCAP


htn


DM


seizure disorder 


sec hyperpth


anemia








Hemodialysis TTS


lytes reviewed


i have added phoslo tid with meals 


anemia epo with hd


abx for hcap, dose for esrd 


bp ok 





Objective





- Vital Signs/Intake and Output


Vital Signs (last 24 hours): 


 











Temp Pulse Resp BP Pulse Ox


 


 98.5 F   64   16   168/79 H  100 


 


 09/22/18 19:49  09/22/18 19:49  09/22/18 19:49  09/22/18 19:49  09/22/18 19:49











- Medications


Medications: 


 Current Medications





Acetaminophen (Tylenol 325mg Tab)  650 mg PO Q6 PRN


   PRN Reason: Pain, Mild (1-3)


   Last Admin: 09/18/18 22:48 Dose:  650 mg


Albuterol/Ipratropium (Duoneb 3 Mg/0.5 Mg (3 Ml) Ud)  3 ml INH RQ6 PRN


   PRN Reason: Shortness of Breath


Dextrose (Dextrose 50% Inj)  0 ml IV STAT PRN; Protocol


   PRN Reason: Hypoglycemia Protocol


Dextrose (Glutose 15)  0 gm PO ONCE PRN; Protocol


   PRN Reason: Hypoglycemia Protocol


Epoetin Arvind (Procrit)  4,000 unit IV TTS [facility]


   Last Admin: 09/22/18 18:32 Dose:  4,000 unit


Glucagon (Glucagen Diagnostic Kit)  0 mg IM STAT PRN; Protocol


   PRN Reason: Hypoglycemia Protocol


Heparin Sodium (Porcine) (Heparin)  5,000 units SC Q8 [facility]


   PRN Reason: Protocol


   Last Admin: 09/22/18 16:49 Dose:  5,000 units


Vancomycin HCl 1 gm/ Sodium (Chloride)  250 mls @ 125 mls/hr IVPB TTS [facility]


   PRN Reason: Protocol


   Last Admin: 09/22/18 18:33 Dose:  125 mls/hr


Insulin Human Regular (Humulin R)  0 units SC ACHS BOWEN


   PRN Reason: Protocol


   Last Admin: 09/22/18 21:31 Dose:  Not Given


Ketorolac Tromethamine (Toradol)  30 mg IVP Q6 PRN


   PRN Reason: Pain, moderate (4-7)


   Last Admin: 09/22/18 09:11 Dose:  30 mg


Ondansetron HCl (Zofran Inj)  4 mg IVP Q6 PRN


   PRN Reason: Nausea/Vomiting


   Last Admin: 09/18/18 20:45 Dose:  4 mg


Pantoprazole Sodium (Protonix Ec Tab)  40 mg PO DAILY [facility]


   Last Admin: 09/22/18 09:09 Dose:  40 mg


Phenytoin Sodium (Dilantin)  100 mg PO TID [facility]


   Last Admin: 09/22/18 16:49 Dose:  100 mg











- Labs


Labs: 


 





 09/20/18 05:15 





 09/20/18 05:15 





 











PT  10.6 Seconds (9.8-13.1)   09/18/18  11:24    


 


INR  1.0   09/18/18  11:24    


 


APTT  29.6 Seconds (25.6-37.1)   09/18/18  11:24

## 2018-09-22 NOTE — PN
DATE:  09/21/2018



DAILY PROGRESS NOTE



SUBJECTIVE:  The patient is seen today, 09/21/2018.  She is still

complaining of left-sided pain and low back pain.  Pain was radiating also

to the front of the abdomen.



PHYSICAL EXAMINATION:

VITAL SIGNS:  Blood pressure 148/71, temperature 98.5, respiratory rate 20,

and pulse 76.

HEENT:  Pupils are equal and reactive to light.  Normal-appearing mucosa of

the conjunctivae, oropharynx, and nasal membrane mucosa.

NECK:  Supple.  No JVD.  No carotid bruit.  No lymph nodes.  No

thyromegaly.

CHEST AND LUNGS:  Bilateral symmetrical expansion.  Good air exchange.  No

rales.  No rhonchi.

CARDIOVASCULAR SYSTEM:  PMI not localized.  S1, S2.  No additional sounds.

ABDOMEN:  Normoactive bowel sounds.  No tenderness.  No organomegaly.  No

masses.

EXTREMITIES:  No cyanosis, no clubbing, no edema.

CENTRAL NERVOUS SYSTEM:  Alert, awake, oriented x2.  No neurological

deficit could be appreciated.



ASSESSMENT:  Status post hyperkalemia, end-stage renal disease, on

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

back pain that referred to the anterior abdominal wall, type 2 diabetes

mellitus, seizure disorder.



PLAN:  Continue current medications.  We will follow ID recommendations.





__________________________________________

Sundar Arndt MD





DD:  09/21/2018 23:51:49

DT:  09/21/2018 23:53:54

Job # [id number]

## 2018-09-23 RX ADMIN — PANTOPRAZOLE SODIUM SCH MG: 40 TABLET, DELAYED RELEASE ORAL at 08:45

## 2018-09-23 NOTE — PN
DATE:  09/22/2018



SUBJECTIVE:   The patient is seen today, 09/22/2018.  She is having less

back pain and abdominal pain.



OBJECTIVE:

VITAL SIGNS:  Blood pressure is 158/68, temperature 98.1, respiratory rate

20, and pulse 72.

HEENT:  Pupils equal and reactive to light.  Normal-appearing mucosa of the

conjunctivae, oropharynx, and nasal membrane mucosa.

NECK:  Supple.  No JVD.  No carotid bruit.  No lymph nodes.  No

thyromegaly.

CHEST AND LUNGS:  Bilateral symmetrical expansion.  Good air exchange.  No

rales, no rhonchi.

CARDIOVASCULAR SYSTEM:  PMI not localized.  S1, S2.  No additional sounds.

ABDOMEN:  Normoactive bowel sounds.  No tenderness.  No organomegaly.  No

masses.

EXTREMITIES:  No cyanosis, no clubbing, no edema.

CNS:  Alert, awake, oriented x2.  No neurological deficit could be

appreciated.



ASSESSMENT:

1.  Status post hyperkalemia.

2.  End-stage renal disease, on hemodialysis.

3.  Congestive heart failure/pneumonia.



PLAN:  Continue current antibiotics and current medications including

insulin coverage with [employer]-Cheks.





__________________________________________

Sundar Arndt MD



DD:  09/22/2018 23:57:07

DT:  09/22/2018 23:59:32

Job # [id number]

## 2018-09-24 VITALS — SYSTOLIC BLOOD PRESSURE: 148 MMHG | DIASTOLIC BLOOD PRESSURE: 66 MMHG

## 2018-09-24 VITALS — TEMPERATURE: 98 F | OXYGEN SATURATION: 98 % | HEART RATE: 81 BPM | RESPIRATION RATE: 20 BRPM

## 2018-09-24 LAB
BUN SERPL-MCNC: 33 MG/DL (ref 7–17)
CALCIUM SERPL-MCNC: 9.1 MG/DL (ref 8.4–10.2)
ERYTHROCYTE [DISTWIDTH] IN BLOOD BY AUTOMATED COUNT: 16.9 % (ref 11.5–14.5)
GFR NON-AFRICAN AMERICAN: 7
HGB BLD-MCNC: 9.6 G/DL (ref 12–16)
MCH RBC QN AUTO: 26.6 PG (ref 27–31)
MCHC RBC AUTO-ENTMCNC: 32.1 G/DL (ref 33–37)
MCV RBC AUTO: 82.9 FL (ref 81–99)
PLATELET # BLD: 270 K/UL (ref 130–400)
RBC # BLD AUTO: 3.6 MIL/UL (ref 3.8–5.2)
WBC # BLD AUTO: 11.2 K/UL (ref 4.8–10.8)

## 2018-09-24 RX ADMIN — PANTOPRAZOLE SODIUM SCH MG: 40 TABLET, DELAYED RELEASE ORAL at 08:20

## 2018-09-24 NOTE — CP.PCM.PN
Subjective





- Date & Time of Evaluation


Date of Evaluation: 09/24/18


Time of Evaluation: 11:58





- Subjective


Subjective: 





ID note-





pt. seen and examined.





no new events.





nor cough and denies any sob.





afebrile





Objective





- Vital Signs/Intake and Output


Vital Signs (last 24 hours): 


 











Temp Pulse Resp BP Pulse Ox


 


 98.5 F   67   22   173/72 H  94 L


 


 09/24/18 07:42  09/24/18 10:21  09/24/18 07:42  09/24/18 10:21  09/24/18 07:42











- Medications


Medications: 


 Current Medications





Acetaminophen (Tylenol 325mg Tab)  650 mg PO Q6 PRN


   PRN Reason: Pain, Mild (1-3)


   Last Admin: 09/24/18 05:27 Dose:  650 mg


Albuterol/Ipratropium (Duoneb 3 Mg/0.5 Mg (3 Ml) Ud)  3 ml INH RQ6 PRN


   PRN Reason: Shortness of Breath


Dextrose (Dextrose 50% Inj)  0 ml IV STAT PRN; Protocol


   PRN Reason: Hypoglycemia Protocol


Dextrose (Glutose 15)  0 gm PO ONCE PRN; Protocol


   PRN Reason: Hypoglycemia Protocol


Epoetin Arvind (Procrit)  4,000 unit IV TTS [facility]


   Last Admin: 09/22/18 18:32 Dose:  4,000 unit


Glucagon (Glucagen Diagnostic Kit)  0 mg IM STAT PRN; Protocol


   PRN Reason: Hypoglycemia Protocol


Insulin Human Regular (Humulin R)  0 units SC ACHS BOWEN


   PRN Reason: Protocol


   Last Admin: 09/24/18 07:03 Dose:  2 unit


Lisinopril (Zestril)  20 mg PO DAILY [facility]


   Last Admin: 09/24/18 10:21 Dose:  20 mg


Nifedipine (Procardia Xl)  60 mg PO DAILY [facility]


   Last Admin: 09/24/18 10:21 Dose:  60 mg


Ondansetron HCl (Zofran Inj)  4 mg IVP Q6 PRN


   PRN Reason: Nausea/Vomiting


   Last Admin: 09/18/18 20:45 Dose:  4 mg


Pantoprazole Sodium (Protonix Ec Tab)  40 mg PO DAILY [facility] hospital


   Last Admin: 09/24/18 08:20 Dose:  40 mg


Phenytoin Sodium (Dilantin)  100 mg PO TID [facility]


   Last Admin: 09/24/18 08:20 Dose:  100 mg











- Labs


Labs: 


 





 09/24/18 09:30 





 09/24/18 09:30 





 











PT  10.6 Seconds (9.8-13.1)   09/18/18  11:24    


 


INR  1.0   09/18/18  11:24    


 


APTT  29.6 Seconds (25.6-37.1)   09/18/18  11:24    














- Additional Findings


Additional findings: 








- Constitutional


Appears: No Acute Distress





- Head Exam


Head Exam: ATRAUMATIC





- Eye Exam


Eye Exam: EOMI, PERRL





- ENT Exam


ENT Exam: Normal Oropharynx





- Neck Exam


Neck exam: Positive for: Full Rom





- Respiratory Exam


Additional comments: 


better breath sounds b/l


no wheezing








- Cardiovascular Exam


Cardiovascular Exam: RRR, +S1, +S2





- GI/Abdominal Exam


GI & Abdominal Exam: Normal Bowel Sounds, Soft


Additional comments: 





NT, ND


no guarding, no rebound


no CVA tenderness





- Extremities Exam


Extremities exam: Positive for: normal inspection





- Neurological Exam


Additional comments: 





AAO x 3





 Laboratory Results - last 72 hr











  09/19/18 09/21/18 09/21/18





  18:30 15:53 22:04


 


WBC   


 


RBC   


 


Hgb   


 


Hct   


 


MCV   


 


MCH   


 


MCHC   


 


RDW   


 


Plt Count   


 


Sodium   


 


Potassium   


 


Chloride   


 


Carbon Dioxide   


 


Anion Gap   


 


BUN   


 


Creatinine   


 


Est GFR ( Amer)   


 


Est GFR (Non-Af Amer)   


 


POC Glucose (mg/dL)   169 H  127 H


 


Random Glucose   


 


Lactic Acid   


 


Calcium   


 


Mycoplasma pneumon IgG  2.18 H  


 


Mycoplasma pneumon IgM  611  














  09/22/18 09/22/18 09/22/18





  05:30 11:37 15:48


 


WBC   


 


RBC   


 


Hgb   


 


Hct   


 


MCV   


 


MCH   


 


MCHC   


 


RDW   


 


Plt Count   


 


Sodium   


 


Potassium   


 


Chloride   


 


Carbon Dioxide   


 


Anion Gap   


 


BUN   


 


Creatinine   


 


Est GFR ( Amer)   


 


Est GFR (Non-Af Amer)   


 


POC Glucose (mg/dL)  110  227 H  151 H


 


Random Glucose   


 


Lactic Acid   


 


Calcium   


 


Mycoplasma pneumon IgG   


 


Mycoplasma pneumon IgM   














  09/22/18 09/23/18 09/23/18





  20:55 05:15 10:53


 


WBC   


 


RBC   


 


Hgb   


 


Hct   


 


MCV   


 


MCH   


 


MCHC   


 


RDW   


 


Plt Count   


 


Sodium   


 


Potassium   


 


Chloride   


 


Carbon Dioxide   


 


Anion Gap   


 


BUN   


 


Creatinine   


 


Est GFR ( Amer)   


 


Est GFR (Non-Af Amer)   


 


POC Glucose (mg/dL)  71  154 H  209 H


 


Random Glucose   


 


Lactic Acid   


 


Calcium   


 


Mycoplasma pneumon IgG   


 


Mycoplasma pneumon IgM   














  09/23/18 09/23/18 09/24/18





  16:08 21:00 05:01


 


WBC   


 


RBC   


 


Hgb   


 


Hct   


 


MCV   


 


MCH   


 


MCHC   


 


RDW   


 


Plt Count   


 


Sodium   


 


Potassium   


 


Chloride   


 


Carbon Dioxide   


 


Anion Gap   


 


BUN   


 


Creatinine   


 


Est GFR ( Amer)   


 


Est GFR (Non-Af Amer)   


 


POC Glucose (mg/dL)  154 H  112 H  153 H


 


Random Glucose   


 


Lactic Acid   


 


Calcium   


 


Mycoplasma pneumon IgG   


 


Mycoplasma pneumon IgM   














  09/24/18 09/24/18 09/24/18





  09:30 09:30 09:30


 


WBC  11.2 H  


 


RBC  3.60 L  


 


Hgb  9.6 L  


 


Hct  29.8 L  


 


MCV  82.9  


 


MCH  26.6 L  


 


MCHC  32.1 L  


 


RDW  16.9 H  


 


Plt Count  270  D  


 


Sodium   136 


 


Potassium   4.2 


 


Chloride   97 L 


 


Carbon Dioxide   31 H 


 


Anion Gap   12 


 


BUN   33 H 


 


Creatinine   5.9 H 


 


Est GFR ( Amer)   9 


 


Est GFR (Non-Af Amer)   7 


 


POC Glucose (mg/dL)   


 


Random Glucose   82 


 


Lactic Acid    1.0


 


Calcium   9.1 


 


Mycoplasma pneumon IgG   


 


Mycoplasma pneumon IgM   








 Microbiology





09/18/18 22:04   Blood-Venous   Blood Culture - Final


                            NO GROWTH AFTER 5 DAYS


09/18/18 22:04   Blood-Venous   Gram Stain - Final


                            TEST NOT PERFORMED


09/18/18 22:04   Blood-Venous   Blood Culture - Final


                            NO GROWTH AFTER 5 DAYS


09/18/18 22:04   Blood-Venous   Gram Stain - Final


                            TEST NOT PERFORMED


09/19/18 19:00   Blood-Venous   Blood Culture - Preliminary


                            NO GROWTH AFTER 4 DAYS


09/19/18 18:30   Blood-Venous   Blood Culture - Preliminary


                            NO GROWTH AFTER 4 DAYS


09/18/18 13:40   Blood   Blood Culture - Final


                            NO GROWTH AFTER 5 DAYS


09/18/18 13:40   Blood   Gram Stain - Final


                            TEST NOT PERFORMED


09/18/18 11:38   Blood   Blood Culture - Final


                            NO GROWTH AFTER 5 DAYS


09/18/18 11:38   Blood   Gram Stain - Final


                            TEST NOT PERFORMED


09/20/18 18:20   Naris   MRSA Culture (Admit) - Final


                            MRSA NOT DETECTED


09/19/18 09:39   Urine,Catheterized   Urine Culture - Final


                            No Growth (<1,000 CFU/ML)


09/18/18 17:00   Nose   MRSA Culture (Admit) - Final


                            MRSA NOT DETECTED





 


 











Assessment and Plan


(1) Chronic kidney disease with end stage renal failure on dialysis


Status: Acute   





(2) Acute-on-chronic renal failure


Status: Acute   





(3) Hyperkalemia


Status: Acute   





(4) Fever


Status: Acute   





- Assessment and Plan (Free Text)


Assessment: 





A/P-


72 year old female with PMH of DM II, ESRD on HD, admitted with sob and resp 

distress and fever.





clinically much better.


has remained afebrile.


leukocytosis almost resolved, much improved.


blood cx- negative x 6


UA- negative


urine cx- neg


high Pro-BNP


mycoplasma IGM- neg











Plan-


had completed 5 days of empiric IV vanco and zosyn to cover for ? HAP.


completed 3 days of zithromax as well.


no need for further IV abx at this time.


HD as per renal team.


CHF management as per primary doc and cardiology.

## 2018-09-24 NOTE — CP.PCM.PN
Subjective





- Date & Time of Evaluation


Date of Evaluation: 09/24/18


Time of Evaluation: 08:06





- Subjective


Subjective: 





patient conscious and alert not in acute distress.


Patient feeling much better no shortness of breath


No abdominal pain





Objective





- Vital Signs/Intake and Output


Vital Signs (last 24 hours): 


 











Temp Pulse Resp BP Pulse Ox


 


 98.5 F   67   22   173/72 H  94 L


 


 09/24/18 07:42  09/24/18 07:42  09/24/18 07:42  09/24/18 07:42  09/24/18 07:42











- Medications


Medications: 


 Current Medications





Acetaminophen (Tylenol 325mg Tab)  650 mg PO Q6 PRN


   PRN Reason: Pain, Mild (1-3)


   Last Admin: 09/24/18 05:27 Dose:  650 mg


Albuterol/Ipratropium (Duoneb 3 Mg/0.5 Mg (3 Ml) Ud)  3 ml INH RQ6 PRN


   PRN Reason: Shortness of Breath


Dextrose (Dextrose 50% Inj)  0 ml IV STAT PRN; Protocol


   PRN Reason: Hypoglycemia Protocol


Dextrose (Glutose 15)  0 gm PO ONCE PRN; Protocol


   PRN Reason: Hypoglycemia Protocol


Epoetin Arvind (Procrit)  4,000 unit IV TTS [facility]


   Last Admin: 09/22/18 18:32 Dose:  4,000 unit


Glucagon (Glucagen Diagnostic Kit)  0 mg IM STAT PRN; Protocol


   PRN Reason: Hypoglycemia Protocol


Insulin Human Regular (Humulin R)  0 units SC ACHS [facility]


   PRN Reason: Protocol


   Last Admin: 09/24/18 07:03 Dose:  2 unit


Lisinopril (Zestril)  20 mg PO DAILY [facility]


Ondansetron HCl (Zofran Inj)  4 mg IVP Q6 PRN


   PRN Reason: Nausea/Vomiting


   Last Admin: 09/18/18 20:45 Dose:  4 mg


Pantoprazole Sodium (Protonix Ec Tab)  40 mg PO DAILY [facility]


   Last Admin: 09/23/18 08:45 Dose:  40 mg


Phenytoin Sodium (Dilantin)  100 mg PO TID [facility]


   Last Admin: 09/23/18 16:52 Dose:  100 mg











- Labs


Labs: 


 





 09/20/18 05:15 





 09/20/18 05:15 





 











PT  10.6 Seconds (9.8-13.1)   09/18/18  11:24    


 


INR  1.0   09/18/18  11:24    


 


APTT  29.6 Seconds (25.6-37.1)   09/18/18  11:24    














- Constitutional


Appears: No Acute Distress





- Eye Exam


Eye Exam: Conjunctival injection





- ENT Exam


ENT Exam: Mucous Membranes Moist





- Neck Exam


Neck Exam: absent: Lymphadenopathy





- Respiratory Exam


Respiratory Exam: NORMAL BREATHING PATTERN.  absent: Chest Wall Tenderness





- Cardiovascular Exam


Cardiovascular Exam: absent: Gallop, JVD, Rubs





- GI/Abdominal Exam


GI & Abdominal Exam: Soft, Normal Bowel Sounds





- Extremities Exam


Extremities Exam: absent: Calf Tenderness





- Back Exam


Back Exam: absent: CVA tenderness (L), CVA tenderness (R)





- Neurological Exam


Neurological Exam: Alert





- Psychiatric Exam


Psychiatric exam: Normal Affect





- Skin


Skin Exam: absent: Cyanosis





Assessment and Plan


(1) Acute pulmonary edema


Status: Acute   





(2) Hyperkalemia


Status: Acute   





(3) Sepsis


Status: Acute   





(4) Chronic kidney disease with end stage renal failure on dialysis


Assessment & Plan: 


HTN, 


Diabetes,


, Chronic Kidney Disease,End Stage Renal Disease (Hemodialysis TTS)


 Seizures 





the plan


Leukocytosis improving patient receiving  antibiotics as per ID


Hemodialysis TTS


blood pressure elevated this morning she was given stat intravenous labetalol


Add lisinopril and possible Lopressor


Status: Acute

## 2018-09-25 NOTE — PN
DATE:  09/23/2018



SUBJECTIVE:  The patient was seen on 09/23/2018.  She was not in any

cardiopulmonary distress.



OBJECTIVE:

VITAL SIGNS:  Blood pressure was 170/75, temperature 98.3, respiratory rate

20, and pulse 71.

HEENT:  Pupils equal and reactive to light.  Normal-appearing mucosa of the

conjunctivae, oropharynx, and nasal membrane mucosa.

NECK:  Supple.  No JVD.  No carotid bruit.  No lymph node.  No thyromegaly.

CHEST AND LUNGS:  Bilateral symmetrical expansion.  Good air exchange.  No

rales, no rhonchi.

CARDIOVASCULAR SYSTEM:  PMI not localized.  S1, S2.  No additional sounds.

ABDOMEN:  Normoactive bowel sounds.  No tenderness.  No organomegaly.  No

masses.

EXTREMITIES:  No cyanosis, no clubbing, no edema.

CNS:  Alert, awake, oriented x2 and moves all extremities equally.  No

neurological deficit could be appreciated.



ASSESSMENT:

1.  Congestive heart failure/pneumonia.

2.  Status post hyperkalemia.

3.  End-stage renal disease, on hemodialysis.

4.  Hypertension.



PLAN:  Continue current medications and antibiotics as per ID.  If patient

is stable, will be discharged tomorrow to continue hemodialysis as an

outpatient.





__________________________________________

Sundar Arndt MD



DD:  09/24/2018 21:21:10

DT:  09/24/2018 21:22:26

Job # [id number]

## 2018-09-25 NOTE — DS
REASON FOR ADMISSION:  This is a 72-year-old  female with history

of multiple medical problems including end-stage renal disease, on

hemodialysis, who was admitted for shortness of breath secondary to volume

overload, found also to have hyperkalemia.



COURSE OF HOSPITALIZATION:  The patient was admitted and treated for the

hyperkalemia.  The patient had leukocytosis and fever on admission, and she

was treated for healthcare-related pneumonia.  The patient had an ID

consult done by Dr. Coy.  The patient responded well to the

antibiotics.  The patient continued on hemodialysis as per Dr. Gonzalez,

during this admission.  The patient was discharged home in a stable

condition to follow with her primary care physician, Dr. Vincent Johnson.



FINAL DIAGNOSES:  Pneumonia; congestive heart failure; hyperkalemia;

end-stage renal disease, on hemodialysis; hypertension.





__________________________________________

Sundar Arndt MD



DD:  09/24/2018 21:23:09

DT:  09/24/2018 21:28:07

Job # [id number]

## 2018-09-27 NOTE — PQF
PROVIDER RESPONSE TEXT:



Yes patient had sepsis secondary to pneumonia



REVIEWER QUERY TEXT:



Rule Out Sepsis Clarification



To Rule out Sepsis is documented in the Medical Record. Please clarify whether:

-- Patient has sepsis

- Please document confirmed, suspected or probable causative organism

- Please document confirmed, suspected or probable localized infection

- Please clarify if sepsis is related to a device

- Please clarify if sepsis was present on admission

-- Sepsis was ruled out (include corresponding diagnosis for patient?s clinical picture and treatment
)

-- Patient had sepsis which is resolved

-- Other, please specify



The patient's Clinical Indicators include:

Admitted with SOB.



ER: Sepsis, acute on chronic renal failure, CHF exacerbation, Hyperkalemia







ID: Asked to see this patient for R/O Sepsis. Etiology of fever unclear at this time however pneumoni
a likely, ? HAP



Renal: Sepsis, acute pulmonary edema, hyperkalemia, ESRD



ICU: Severe sepsis without shock r/o intra abdominal/lung pathology,  AMS 2* metabolic encephalopathy
: r/O Sepsis encephalopathy, occult seizure activity, cva, Fluid overload



WBC 11.3-> 21.8-> 18.3, L shift 6% BANDS,  Lactic Acid 2.4

CXR: Cardiomegaly, bibasilar atelectasis

BLOOD  and URINE CS No growth



Rx: IVAB



TEMP: 102.6, 100.8, 100.8, 100.1, 99.1, 97.7, 99.1

HR: 85, 85, 93, 90, 98, 89, 101, 97, 100, 94, 93, 93, 85, 82, 81

BP: 137/77, 164/68, 161, 62, 147/66, 148/68, 135/76, 145/71

R: 39, 39, 27, 21, 25, 22, 17, 28, 24, 25, 22, 16





Query created by: Puja Jacob on 9/24/2018 2:21 PM





Electronically signed by:  Sundar Arndt MD 9/27/2018 7:33 AM